# Patient Record
Sex: MALE | Race: WHITE | NOT HISPANIC OR LATINO
[De-identification: names, ages, dates, MRNs, and addresses within clinical notes are randomized per-mention and may not be internally consistent; named-entity substitution may affect disease eponyms.]

---

## 2017-03-24 ENCOUNTER — APPOINTMENT (OUTPATIENT)
Dept: INTERNAL MEDICINE | Facility: CLINIC | Age: 77
End: 2017-03-24

## 2017-03-29 LAB
INR PPP: 2 RATIO
POCT-PROTHROMBIN TIME: 23.7 SECS

## 2017-04-28 ENCOUNTER — APPOINTMENT (OUTPATIENT)
Dept: INTERNAL MEDICINE | Facility: CLINIC | Age: 77
End: 2017-04-28

## 2017-04-28 LAB
INR PPP: 2.1 RATIO
POCT-PROTHROMBIN TIME: 25.4 SECS

## 2017-05-24 ENCOUNTER — APPOINTMENT (OUTPATIENT)
Dept: INTERNAL MEDICINE | Facility: CLINIC | Age: 77
End: 2017-05-24

## 2017-05-24 ENCOUNTER — RESULT CHARGE (OUTPATIENT)
Age: 77
End: 2017-05-24

## 2017-05-30 ENCOUNTER — OTHER (OUTPATIENT)
Age: 77
End: 2017-05-30

## 2017-05-30 LAB
CHOLEST SERPL-MCNC: 144
GLUCOSE SERPL-MCNC: 90
HBA1C MFR BLD HPLC: 5.7
HDLC SERPL-MCNC: 54
INR PPP: 2.2 RATIO
LDLC SERPL CALC-MCNC: 80
POCT-PROTHROMBIN TIME: 25.8 SECS
PSA SERPL-MCNC: 90

## 2017-06-06 ENCOUNTER — APPOINTMENT (OUTPATIENT)
Dept: INTERNAL MEDICINE | Facility: CLINIC | Age: 77
End: 2017-06-06

## 2017-06-12 ENCOUNTER — MEDICATION RENEWAL (OUTPATIENT)
Age: 77
End: 2017-06-12

## 2017-06-12 ENCOUNTER — APPOINTMENT (OUTPATIENT)
Dept: INTERNAL MEDICINE | Facility: CLINIC | Age: 77
End: 2017-06-12

## 2017-06-13 ENCOUNTER — OTHER (OUTPATIENT)
Age: 77
End: 2017-06-13

## 2017-06-13 ENCOUNTER — APPOINTMENT (OUTPATIENT)
Dept: INTERNAL MEDICINE | Facility: CLINIC | Age: 77
End: 2017-06-13

## 2017-06-13 DIAGNOSIS — D23.9 OTHER BENIGN NEOPLASM OF SKIN, UNSPECIFIED: ICD-10-CM

## 2017-06-13 DIAGNOSIS — J30.2 OTHER SEASONAL ALLERGIC RHINITIS: ICD-10-CM

## 2017-06-13 DIAGNOSIS — H91.93 UNSPECIFIED HEARING LOSS, BILATERAL: ICD-10-CM

## 2017-06-13 DIAGNOSIS — Z88.9 ALLERGY STATUS TO UNSPECIFIED DRUGS, MEDICAMENTS AND BIOLOGICAL SUBSTANCES: ICD-10-CM

## 2017-06-13 DIAGNOSIS — M19.90 UNSPECIFIED OSTEOARTHRITIS, UNSPECIFIED SITE: ICD-10-CM

## 2017-06-13 DIAGNOSIS — R42 DIZZINESS AND GIDDINESS: ICD-10-CM

## 2017-06-29 ENCOUNTER — RX RENEWAL (OUTPATIENT)
Age: 77
End: 2017-06-29

## 2017-07-06 ENCOUNTER — RESULT CHARGE (OUTPATIENT)
Age: 77
End: 2017-07-06

## 2017-07-07 ENCOUNTER — APPOINTMENT (OUTPATIENT)
Dept: INTERNAL MEDICINE | Facility: CLINIC | Age: 77
End: 2017-07-07

## 2017-07-07 ENCOUNTER — OTHER (OUTPATIENT)
Age: 77
End: 2017-07-07

## 2017-07-18 ENCOUNTER — RX RENEWAL (OUTPATIENT)
Age: 77
End: 2017-07-18

## 2017-07-24 LAB
INR PPP: 2.3 RATIO
POCT-PROTHROMBIN TIME: 27.4 SECS

## 2017-08-11 ENCOUNTER — APPOINTMENT (OUTPATIENT)
Dept: INTERNAL MEDICINE | Facility: CLINIC | Age: 77
End: 2017-08-11
Payer: MEDICARE

## 2017-08-11 PROCEDURE — 99212 OFFICE O/P EST SF 10 MIN: CPT | Mod: 25

## 2017-08-11 PROCEDURE — 85610 PROTHROMBIN TIME: CPT | Mod: QW

## 2017-08-30 LAB — INR PPP: 2.3 RATIO

## 2017-09-27 ENCOUNTER — APPOINTMENT (OUTPATIENT)
Dept: INTERNAL MEDICINE | Facility: CLINIC | Age: 77
End: 2017-09-27
Payer: MEDICARE

## 2017-09-27 VITALS
SYSTOLIC BLOOD PRESSURE: 132 MMHG | RESPIRATION RATE: 16 BRPM | HEART RATE: 74 BPM | BODY MASS INDEX: 44.1 KG/M2 | HEIGHT: 71 IN | OXYGEN SATURATION: 97 % | WEIGHT: 315 LBS | TEMPERATURE: 98.2 F | DIASTOLIC BLOOD PRESSURE: 60 MMHG

## 2017-09-27 LAB
INR PPP: 2.1
PT BLD: 25.5

## 2017-09-27 PROCEDURE — 99214 OFFICE O/P EST MOD 30 MIN: CPT | Mod: 25

## 2017-09-27 PROCEDURE — G0008: CPT

## 2017-09-27 PROCEDURE — 90662 IIV NO PRSV INCREASED AG IM: CPT

## 2017-09-27 PROCEDURE — 94060 EVALUATION OF WHEEZING: CPT

## 2017-09-27 PROCEDURE — 94729 DIFFUSING CAPACITY: CPT

## 2017-10-02 ENCOUNTER — RX RENEWAL (OUTPATIENT)
Age: 77
End: 2017-10-02

## 2017-11-13 ENCOUNTER — RX RENEWAL (OUTPATIENT)
Age: 77
End: 2017-11-13

## 2017-11-21 ENCOUNTER — RESULT CHARGE (OUTPATIENT)
Age: 77
End: 2017-11-21

## 2017-11-21 ENCOUNTER — APPOINTMENT (OUTPATIENT)
Dept: INTERNAL MEDICINE | Facility: CLINIC | Age: 77
End: 2017-11-21
Payer: MEDICARE

## 2017-11-21 PROCEDURE — ZZZZZ: CPT

## 2017-11-21 PROCEDURE — 85610 PROTHROMBIN TIME: CPT | Mod: QW

## 2017-11-21 PROCEDURE — 99212 OFFICE O/P EST SF 10 MIN: CPT | Mod: 25

## 2017-12-01 ENCOUNTER — OTHER (OUTPATIENT)
Age: 77
End: 2017-12-01

## 2017-12-28 ENCOUNTER — APPOINTMENT (OUTPATIENT)
Dept: INTERNAL MEDICINE | Facility: CLINIC | Age: 77
End: 2017-12-28
Payer: MEDICARE

## 2017-12-28 LAB
INR PPP: 2.2 RATIO
POCT-PROTHROMBIN TIME: 26 SECS

## 2017-12-28 PROCEDURE — 99212 OFFICE O/P EST SF 10 MIN: CPT | Mod: 25

## 2017-12-28 PROCEDURE — 85610 PROTHROMBIN TIME: CPT | Mod: QW

## 2018-01-10 ENCOUNTER — RX RENEWAL (OUTPATIENT)
Age: 78
End: 2018-01-10

## 2018-02-01 ENCOUNTER — APPOINTMENT (OUTPATIENT)
Dept: INTERNAL MEDICINE | Facility: CLINIC | Age: 78
End: 2018-02-01
Payer: MEDICARE

## 2018-02-01 LAB
INR PPP: 2 RATIO
POCT-PROTHROMBIN TIME: 23.9 SECS

## 2018-02-01 PROCEDURE — 99212 OFFICE O/P EST SF 10 MIN: CPT | Mod: 25

## 2018-02-01 PROCEDURE — 85610 PROTHROMBIN TIME: CPT | Mod: QW

## 2018-02-20 ENCOUNTER — RX RENEWAL (OUTPATIENT)
Age: 78
End: 2018-02-20

## 2018-03-23 ENCOUNTER — APPOINTMENT (OUTPATIENT)
Dept: INTERNAL MEDICINE | Facility: CLINIC | Age: 78
End: 2018-03-23
Payer: MEDICARE

## 2018-03-23 LAB
INR PPP: 2.2 RATIO
POCT-PROTHROMBIN TIME: 26.5 SECS

## 2018-03-23 PROCEDURE — 85610 PROTHROMBIN TIME: CPT | Mod: QW

## 2018-03-23 PROCEDURE — 99212 OFFICE O/P EST SF 10 MIN: CPT | Mod: 25

## 2018-04-06 ENCOUNTER — APPOINTMENT (OUTPATIENT)
Dept: INTERNAL MEDICINE | Facility: CLINIC | Age: 78
End: 2018-04-06

## 2018-05-24 ENCOUNTER — APPOINTMENT (OUTPATIENT)
Dept: INTERNAL MEDICINE | Facility: CLINIC | Age: 78
End: 2018-05-24
Payer: MEDICARE

## 2018-05-24 PROCEDURE — 85610 PROTHROMBIN TIME: CPT | Mod: QW

## 2018-05-24 PROCEDURE — 99212 OFFICE O/P EST SF 10 MIN: CPT | Mod: 25

## 2018-06-02 ENCOUNTER — EMERGENCY (EMERGENCY)
Facility: HOSPITAL | Age: 78
LOS: 0 days | Discharge: HOME | End: 2018-06-02
Attending: EMERGENCY MEDICINE | Admitting: EMERGENCY MEDICINE
Payer: MEDICARE

## 2018-06-02 VITALS
DIASTOLIC BLOOD PRESSURE: 63 MMHG | HEART RATE: 53 BPM | SYSTOLIC BLOOD PRESSURE: 121 MMHG | OXYGEN SATURATION: 96 % | RESPIRATION RATE: 17 BRPM | TEMPERATURE: 98 F

## 2018-06-02 VITALS — WEIGHT: 300.05 LBS

## 2018-06-02 DIAGNOSIS — K92.1 MELENA: ICD-10-CM

## 2018-06-02 DIAGNOSIS — K62.5 HEMORRHAGE OF ANUS AND RECTUM: ICD-10-CM

## 2018-06-02 DIAGNOSIS — Z79.01 LONG TERM (CURRENT) USE OF ANTICOAGULANTS: ICD-10-CM

## 2018-06-02 DIAGNOSIS — Z98.890 OTHER SPECIFIED POSTPROCEDURAL STATES: Chronic | ICD-10-CM

## 2018-06-02 DIAGNOSIS — I48.91 UNSPECIFIED ATRIAL FIBRILLATION: ICD-10-CM

## 2018-06-02 LAB
ALBUMIN SERPL ELPH-MCNC: 3.5 G/DL — SIGNIFICANT CHANGE UP (ref 3.3–5)
ALP SERPL-CCNC: 93 U/L — SIGNIFICANT CHANGE UP (ref 40–120)
ALT FLD-CCNC: 29 U/L — SIGNIFICANT CHANGE UP (ref 12–78)
ANION GAP SERPL CALC-SCNC: 8 MMOL/L — SIGNIFICANT CHANGE UP (ref 5–17)
APTT BLD: 37.9 SEC — HIGH (ref 27.5–37.4)
AST SERPL-CCNC: 25 U/L — SIGNIFICANT CHANGE UP (ref 15–37)
BASOPHILS # BLD AUTO: 0.03 K/UL — SIGNIFICANT CHANGE UP (ref 0–0.2)
BASOPHILS NFR BLD AUTO: 0.4 % — SIGNIFICANT CHANGE UP (ref 0–2)
BILIRUB SERPL-MCNC: 0.9 MG/DL — SIGNIFICANT CHANGE UP (ref 0.2–1.2)
BLD GP AB SCN SERPL QL: SIGNIFICANT CHANGE UP
BUN SERPL-MCNC: 25 MG/DL — HIGH (ref 7–23)
CALCIUM SERPL-MCNC: 8.7 MG/DL — SIGNIFICANT CHANGE UP (ref 8.5–10.1)
CHLORIDE SERPL-SCNC: 108 MMOL/L — SIGNIFICANT CHANGE UP (ref 96–108)
CO2 SERPL-SCNC: 25 MMOL/L — SIGNIFICANT CHANGE UP (ref 22–31)
CREAT SERPL-MCNC: 1.15 MG/DL — SIGNIFICANT CHANGE UP (ref 0.5–1.3)
EOSINOPHIL # BLD AUTO: 0.2 K/UL — SIGNIFICANT CHANGE UP (ref 0–0.5)
EOSINOPHIL NFR BLD AUTO: 2.7 % — SIGNIFICANT CHANGE UP (ref 0–6)
GLUCOSE SERPL-MCNC: 85 MG/DL — SIGNIFICANT CHANGE UP (ref 70–99)
HCT VFR BLD CALC: 42.5 % — SIGNIFICANT CHANGE UP (ref 39–50)
HGB BLD-MCNC: 13.8 G/DL — SIGNIFICANT CHANGE UP (ref 13–17)
IMM GRANULOCYTES NFR BLD AUTO: 0.4 % — SIGNIFICANT CHANGE UP (ref 0–1.5)
INR BLD: 2.78 RATIO — HIGH (ref 0.88–1.16)
LYMPHOCYTES # BLD AUTO: 0.91 K/UL — LOW (ref 1–3.3)
LYMPHOCYTES # BLD AUTO: 12.5 % — LOW (ref 13–44)
MCHC RBC-ENTMCNC: 29.2 PG — SIGNIFICANT CHANGE UP (ref 27–34)
MCHC RBC-ENTMCNC: 32.5 GM/DL — SIGNIFICANT CHANGE UP (ref 32–36)
MCV RBC AUTO: 90 FL — SIGNIFICANT CHANGE UP (ref 80–100)
MONOCYTES # BLD AUTO: 0.44 K/UL — SIGNIFICANT CHANGE UP (ref 0–0.9)
MONOCYTES NFR BLD AUTO: 6 % — SIGNIFICANT CHANGE UP (ref 2–14)
NEUTROPHILS # BLD AUTO: 5.67 K/UL — SIGNIFICANT CHANGE UP (ref 1.8–7.4)
NEUTROPHILS NFR BLD AUTO: 78 % — HIGH (ref 43–77)
NRBC # BLD: 0 /100 WBCS — SIGNIFICANT CHANGE UP (ref 0–0)
PLATELET # BLD AUTO: 145 K/UL — LOW (ref 150–400)
POTASSIUM SERPL-MCNC: 3.9 MMOL/L — SIGNIFICANT CHANGE UP (ref 3.5–5.3)
POTASSIUM SERPL-SCNC: 3.9 MMOL/L — SIGNIFICANT CHANGE UP (ref 3.5–5.3)
PROT SERPL-MCNC: 6.7 GM/DL — SIGNIFICANT CHANGE UP (ref 6–8.3)
PROTHROM AB SERPL-ACNC: 30.7 SEC — HIGH (ref 9.8–12.7)
RBC # BLD: 4.72 M/UL — SIGNIFICANT CHANGE UP (ref 4.2–5.8)
RBC # FLD: 13.6 % — SIGNIFICANT CHANGE UP (ref 10.3–14.5)
SODIUM SERPL-SCNC: 141 MMOL/L — SIGNIFICANT CHANGE UP (ref 135–145)
TROPONIN I SERPL-MCNC: <0.015 NG/ML — SIGNIFICANT CHANGE UP (ref 0.01–0.04)
TROPONIN I SERPL-MCNC: <0.015 NG/ML — SIGNIFICANT CHANGE UP (ref 0.01–0.04)
TYPE + AB SCN PNL BLD: SIGNIFICANT CHANGE UP
WBC # BLD: 7.28 K/UL — SIGNIFICANT CHANGE UP (ref 3.8–10.5)
WBC # FLD AUTO: 7.28 K/UL — SIGNIFICANT CHANGE UP (ref 3.8–10.5)

## 2018-06-02 PROCEDURE — 93010 ELECTROCARDIOGRAM REPORT: CPT

## 2018-06-02 PROCEDURE — 99284 EMERGENCY DEPT VISIT MOD MDM: CPT

## 2018-06-02 NOTE — ED PROVIDER NOTE - OBJECTIVE STATEMENT
78 y/o M with PMHx of sleep apnea with CPAP (no orthopnea or dyspnea on exertion), AFib on Wafarin 7.5 mg 4 days a week and 5 mg 3 days a week presents to the ED c/o passing blood with stool for over a week. Pt reports his stool has been slightly dark. Denies abd pain, n/v, CP, SOB, loss of appetite. Denies CAD, MI stents. Allergic to Advil and stays away from Aspirin. Pt occasionally takes Tylenol. Last coloscopy 8 years ago- pt deemed too large for virtual colonoscopy.  PCP: Dr. Sena.
76 y/o M with PMHx of sleep apnea with CPAP (no orthopnea or dyspnea on exertion), AFib on Wafarin 7.5 mg 4 days a week and 5 mg 3 days a week presents to the ED c/o passing blood with stool for over a week. Pt reports his stool has been slightly dark. Denies abd pain, n/v, CP, SOB, loss of appetite. Denies CAD, MI stents. Allergic to Advil and stays away from Aspirin. Pt occasionally takes Tylenol. Last coloscopy 8 years ago- pt deemed too large for virtual colonoscopy.  PCP: Dr. Sena.

## 2018-06-02 NOTE — ED STATDOCS - PROGRESS NOTE DETAILS
Brigid Powers on behalf of Dr. Cast: 76 y/o Male with a PMHx of A-Fib, DVT/PE(2009) presents to ED c/o blood in stool and bright red blood in toilet bowel with each BM x 1 week. +Melena. No weakness, no dizziness, no SOB, no chest pain, no abd pain. Pt is on Warfarin, last week checked to be 1.9. Denies any hx of GI bleed. Notes he is overdue for colonoscopy. GI Herbert. PMD Manju. Phil Godfrey. Will send patient to main for further evaluation. Brigid Powers on behalf of Dr. Cast: 78 y/o Male with a PMHx of A-Fib, DVT/PE(2009) presents to ED c/o blood in stool and bright red blood in toilet bowel with each BM x 1 week. +Melena. No weakness, no dizziness, no SOB, no chest pain, no abd pain. Pt is on Warfarin, last week checked to be 1.9. Denies any hx of GI bleed. Notes he is overdue for colonoscopy. No prior blood transfusions. GI Herbert. PMD Manju. Phil Godfrey. Will send patient to main for further evaluation.

## 2018-06-02 NOTE — ED PROVIDER NOTE - PROGRESS NOTE DETAILS
Scribe DV for ED attending, Doctor Contino: Rectal exam sphincter tone normal light brown stool guaiac negative Lot [id number] QC passed. Jesu Pierre to discuss further management. Brigid ARREGUIN for ED attending, Doctor Contino: Paging GI Dr. Pierre to discuss further management.

## 2018-06-02 NOTE — ED PROVIDER NOTE - MEDICAL DECISION MAKING DETAILS
Elderly male on Wafarin for AFib, DVT/ PE ambulatory to ED regarding 1 week intermittent episode of bloody bowel movement. No CP, SO, abd pain. Plan: labs, rectal exam, vital signs, discuss with GI.
Elderly male on Wafarin for AFib, DVT/ PE ambulatory to ED regarding 1 week intermittent episode of bloody bowel movement. No CP, SO, abd pain. Plan: labs, rectal exam, vital signs, discuss with GI.

## 2018-06-02 NOTE — ED PROVIDER NOTE - MUSCULOSKELETAL, MLM
MAEx4. B/L pretibial edema no focal tenderness. B/L SLR 30 degrees normal motor.
MAEx4. B/L pretibial edema no focal tenderness. B/L SLR 30 degrees normal motor.

## 2018-06-02 NOTE — ED ADULT NURSE NOTE - CHIEF COMPLAINT QUOTE
pt c/o passing blood when passing BM and blood in stool for 1-2 weeks, no weakness, dizzines SOB or hx of Gi bleed.

## 2018-06-02 NOTE — ED PROVIDER NOTE - CARDIAC, MLM
Irregularly irregular rhythm and reg rate. Normal radial pulse.
Irregularly irregular rhythm and reg rate. Normal radial pulse.

## 2018-06-02 NOTE — ED PROVIDER NOTE - GASTROINTESTINAL, MLM
Abdomen soft, non-tender.  No CVA TTP. Deferred. Bowel sounds hypoactive normal pitch.
Abdomen soft, non-tender.  No CVA TTP.

## 2018-06-02 NOTE — ED ADULT NURSE NOTE - OBJECTIVE STATEMENT
Pt. to the ED C/O Rectal Bleeding from rectum with dark stools x 1 week- Pt. denies nausea, vomiting , abdominal pain, CP and SOB- Pt. states he is on Coumadin  for Afib- Hx. of HTN and HLD- IV, Labs in place- Will cont to monitor patient closely- Safety maintained

## 2018-06-02 NOTE — ED PROVIDER NOTE - PROGRESS NOTE DETAILS
Scribe DV for ED attending, Doctor Contino: Rectal exam sphincter tone normal light brown stool guaiac negative Lot 129 QC passed. Brigid ARREGUIN for ED attending, Doctor Contino: Paging GI Dr. Pierre to discuss further management. Case discussed with Dr. ZAKIA Bolivar on call for GI, he agrees that given pt hemodynamically stable, asymptomatic, normal hemoglobin count with therapeutic INR and guaiac negative, there is no acute medical necessity for admission at this time, he agrees with d/c home, followup 2-3 days with own GI MD. I, Nabor Ramos, attest that this documentation has been prepared under the direction and in the presence of Doctor Contino.

## 2018-06-08 ENCOUNTER — APPOINTMENT (OUTPATIENT)
Dept: INTERNAL MEDICINE | Facility: CLINIC | Age: 78
End: 2018-06-08
Payer: MEDICARE

## 2018-06-08 VITALS
HEIGHT: 71 IN | BODY MASS INDEX: 42.28 KG/M2 | DIASTOLIC BLOOD PRESSURE: 70 MMHG | SYSTOLIC BLOOD PRESSURE: 144 MMHG | RESPIRATION RATE: 18 BRPM | OXYGEN SATURATION: 97 % | HEART RATE: 57 BPM | TEMPERATURE: 97.6 F | WEIGHT: 302 LBS

## 2018-06-08 DIAGNOSIS — Z83.3 FAMILY HISTORY OF DIABETES MELLITUS: ICD-10-CM

## 2018-06-08 DIAGNOSIS — Z78.9 OTHER SPECIFIED HEALTH STATUS: ICD-10-CM

## 2018-06-08 DIAGNOSIS — Z82.3 FAMILY HISTORY OF STROKE: ICD-10-CM

## 2018-06-08 LAB
INR PPP: 2.5 RATIO
POCT-PROTHROMBIN TIME: 30.2 SECS
QUALITY CONTROL: YES

## 2018-06-08 PROCEDURE — 85610 PROTHROMBIN TIME: CPT | Mod: QW

## 2018-06-08 PROCEDURE — 94060 EVALUATION OF WHEEZING: CPT

## 2018-06-08 PROCEDURE — 94729 DIFFUSING CAPACITY: CPT

## 2018-06-08 PROCEDURE — 99214 OFFICE O/P EST MOD 30 MIN: CPT | Mod: 25

## 2018-06-08 PROCEDURE — 94727 GAS DIL/WSHOT DETER LNG VOL: CPT

## 2018-06-08 RX ORDER — MONTELUKAST SODIUM 10 MG/1
10 TABLET, FILM COATED ORAL
Refills: 0 | Status: COMPLETED | COMMUNITY
End: 2018-06-08

## 2018-06-08 RX ORDER — FUROSEMIDE 40 MG/1
40 TABLET ORAL DAILY
Refills: 0 | Status: ACTIVE | COMMUNITY

## 2018-06-08 RX ORDER — BUDESONIDE AND FORMOTEROL FUMARATE DIHYDRATE 160; 4.5 UG/1; UG/1
160-4.5 AEROSOL RESPIRATORY (INHALATION)
Qty: 3 | Refills: 3 | Status: COMPLETED | COMMUNITY
Start: 2017-05-24 | End: 2018-06-08

## 2018-06-08 RX ORDER — BUDESONIDE AND FORMOTEROL FUMARATE DIHYDRATE 160; 4.5 UG/1; UG/1
160-4.5 AEROSOL RESPIRATORY (INHALATION)
Refills: 0 | Status: COMPLETED | COMMUNITY
End: 2018-06-08

## 2018-06-08 RX ORDER — LORATADINE 5 MG/5 ML
10 SOLUTION, ORAL ORAL
Refills: 0 | Status: ACTIVE | COMMUNITY

## 2018-06-08 RX ORDER — DUTASTERIDE 0.5 MG/1
0.5 CAPSULE, LIQUID FILLED ORAL
Refills: 0 | Status: ACTIVE | COMMUNITY

## 2018-06-08 RX ORDER — FLUTICASONE PROPIONATE 50 MCG
50 SPRAY, SUSPENSION NASAL
Refills: 0 | Status: COMPLETED | COMMUNITY
End: 2018-06-08

## 2018-06-08 RX ORDER — POTASSIUM CHLORIDE 1500 MG/1
20 TABLET, EXTENDED RELEASE ORAL
Qty: 180 | Refills: 0 | Status: ACTIVE | COMMUNITY
Start: 2018-03-08

## 2018-06-08 RX ORDER — WARFARIN SODIUM 6 MG/1
TABLET ORAL
Refills: 0 | Status: COMPLETED | COMMUNITY
End: 2018-06-08

## 2018-06-08 RX ORDER — ALFUZOSIN HYDROCHLORIDE 10 MG/1
10 TABLET, EXTENDED RELEASE ORAL
Refills: 0 | Status: COMPLETED | COMMUNITY
End: 2018-06-08

## 2018-06-08 RX ORDER — POTASSIUM CHLORIDE 1500 MG/1
20 TABLET, FILM COATED, EXTENDED RELEASE ORAL DAILY
Refills: 0 | Status: COMPLETED | COMMUNITY
End: 2018-06-08

## 2018-06-08 NOTE — PLAN
[FreeTextEntry1] : He will continue current medications but refuses Symbicort.\par Colonoscopy to be scheduled.\par He has been given clear instructions regarding pre-procedure anticoagulation. \par Last Coumadin dose 5 days before procedure. No AC next day. Start Lovenox 150mg BID for 2 days then AM only the day before procedure. Coumadin and Lovenox BID will be restarted the PM after procedure if OK with Dr Stokes. Both will be continued until INR therapeutic.\par INR 3 days after procedure.\par He will f/u with Dr David.\par FBW in Sept-see RX copy.\par Dr Irizarry for ortho f/u.\par He is due opthal and dental visit now.\par Dr Knott 2x a year for urology f/u.\par He will return in 6 mos or sooner PRN.

## 2018-06-08 NOTE — HISTORY OF PRESENT ILLNESS
[FreeTextEntry8] : "I had some bleeding."\par \par The patient presented to the ER on 6-2-18 on the recommendation of Dr Stokes after reporting 2 weeks of rectal bleeding.  He was sent home w/o intervention and is in the process of scheduling colonoscopy with Dr Stokes. Bleeding has stopped and he denies abd pain, back pain or pelvic pain. \par \par He has been compliant with all medications but stopped Symbicort on his own. Bilat knee pain persists and he will be returning to Dr Irizarry. He reports stable BURKS with mild AM cough but no chest pain, palpitation, wheeze or hemoptysis. he is followed 2x a tear by cardiology. He is compliant with CPAP. Weight is below 300 today.

## 2018-06-08 NOTE — DATA REVIEWED
[FreeTextEntry1] : Limited HH records reviewed from 6-2-18, including EKG. Mild thrombocytopenia.\par EKG AF  with VR 57 and single PFT, bifascicular block, T inv III. \par \par EKG has been reviewed with Dr David. "Bifascicular block is chronic."\par \par PFT is performed today. Flow rates are normal, ERV is reduced and diffusion is normal.

## 2018-06-08 NOTE — REVIEW OF SYSTEMS
[Fatigue] : fatigue [Recent Change In Weight] : ~T recent weight change [Vision Problems] : vision problems [Hearing Loss] : hearing loss [Lower Ext Edema] : lower extremity edema [Orthopena] : orthopnea [Cough] : cough [Dyspnea on Exertion] : dyspnea on exertion [Hesitancy] : hesitancy [Nocturia] : nocturia [Frequency] : frequency [Joint Pain] : joint pain [Joint Stiffness] : joint stiffness [Anxiety] : anxiety [Depression] : depression [Negative] : Heme/Lymph [Fever] : no fever [Chills] : no chills [Night Sweats] : no night sweats [Pain] : no pain [Itching] : no itching [Nosebleeds] : no nosebleeds [Nasal Discharge] : no nasal discharge [Sore Throat] : no sore throat [Hoarseness] : no hoarseness [Chest Pain] : no chest pain [Palpitations] : no palpitations [Claudication] : no  leg claudication [Paroysmal Nocturnal Dyspnea] : no paroysmal nocturnal dyspnea [Wheezing] : no wheezing [Abdominal Pain] : no abdominal pain [Nausea] : no nausea [Constipation] : no constipation [Diarrhea] : no diarrhea [Vomiting] : no vomiting [Heartburn] : no heartburn [Melena] : no melena [Dysuria] : no dysuria [Incontinence] : no incontinence [Hematuria] : no hematuria [Muscle Pain] : no muscle pain [Back Pain] : no back pain [Joint Swelling] : no joint swelling [Hair Changes] : no hair changes [Skin Rash] : no skin rash [Headache] : no headache [Dizziness] : no dizziness [Fainting] : no fainting [Confusion] : no confusion [Unsteady Walk] : no ataxia [Memory Loss] : no memory loss [Insomnia] : no insomnia [Easy Bruising] : no easy bruising [Swollen Glands] : no swollen glands [FreeTextEntry2] : intentional weight loss [FreeTextEntry7] : BRBPR

## 2018-06-08 NOTE — PHYSICAL EXAM
[No Acute Distress] : no acute distress [Well Developed] : well developed [Well-Appearing] : well-appearing [Normal Voice/Communication] : normal voice/communication [Normal Sclera/Conjunctiva] : normal sclera/conjunctiva [PERRL] : pupils equal round and reactive to light [EOMI] : extraocular movements intact [Normal Outer Ear/Nose] : the outer ears and nose were normal in appearance [Normal Oropharynx] : the oropharynx was normal [Normal Nasal Mucosa] : the nasal mucosa was normal [No JVD] : no jugular venous distention [Supple] : supple [No Lymphadenopathy] : no lymphadenopathy [Thyroid Normal, No Nodules] : the thyroid was normal and there were no nodules present [No Respiratory Distress] : no respiratory distress  [Clear to Auscultation] : lungs were clear to auscultation bilaterally [No Accessory Muscle Use] : no accessory muscle use [Normal S1, S2] : normal S1 and S2 [No Carotid Bruits] : no carotid bruits [No Varicosities] : no varicosities [No Extremity Clubbing/Cyanosis] : no extremity clubbing/cyanosis [Soft] : abdomen soft [Non Tender] : non-tender [Normal Bowel Sounds] : normal bowel sounds [Normal Supraclavicular Nodes] : no supraclavicular lymphadenopathy [Normal Anterior Cervical Nodes] : no anterior cervical lymphadenopathy [No CVA Tenderness] : no CVA  tenderness [No Joint Swelling] : no joint swelling [Grossly Normal Strength/Tone] : grossly normal strength/tone [No Rash] : no rash [No Skin Lesions] : no skin lesions [Coordination Grossly Intact] : coordination grossly intact [No Focal Deficits] : no focal deficits [Speech Grossly Normal] : speech grossly normal [Memory Grossly Normal] : memory grossly normal [Normal Affect] : the affect was normal [Alert and Oriented x3] : oriented to person, place, and time [Normal Mood] : the mood was normal [Normal Insight/Judgement] : insight and judgment were intact [de-identified] : obese [de-identified] : wearing glasses [de-identified] : bull neck [de-identified] : irreg irreg bradycardia with 1/6 Deborah at [facility] [de-identified] : 3+ pedal edema bilat with TEDS in place knee high. [de-identified] : obese [de-identified] : Bilat knee pain with weight bearing. Anatalgic gait bilat.

## 2018-06-08 NOTE — PHYSICAL EXAM
[No Acute Distress] : no acute distress [Well Developed] : well developed [Well-Appearing] : well-appearing [Normal Voice/Communication] : normal voice/communication [Normal Sclera/Conjunctiva] : normal sclera/conjunctiva [PERRL] : pupils equal round and reactive to light [EOMI] : extraocular movements intact [Normal Outer Ear/Nose] : the outer ears and nose were normal in appearance [Normal Oropharynx] : the oropharynx was normal [Normal Nasal Mucosa] : the nasal mucosa was normal [No JVD] : no jugular venous distention [Supple] : supple [No Lymphadenopathy] : no lymphadenopathy [Thyroid Normal, No Nodules] : the thyroid was normal and there were no nodules present [No Respiratory Distress] : no respiratory distress  [Clear to Auscultation] : lungs were clear to auscultation bilaterally [No Accessory Muscle Use] : no accessory muscle use [Normal S1, S2] : normal S1 and S2 [No Carotid Bruits] : no carotid bruits [No Varicosities] : no varicosities [No Extremity Clubbing/Cyanosis] : no extremity clubbing/cyanosis [Soft] : abdomen soft [Non Tender] : non-tender [Normal Bowel Sounds] : normal bowel sounds [Normal Supraclavicular Nodes] : no supraclavicular lymphadenopathy [Normal Anterior Cervical Nodes] : no anterior cervical lymphadenopathy [No CVA Tenderness] : no CVA  tenderness [No Joint Swelling] : no joint swelling [Grossly Normal Strength/Tone] : grossly normal strength/tone [No Rash] : no rash [No Skin Lesions] : no skin lesions [Coordination Grossly Intact] : coordination grossly intact [No Focal Deficits] : no focal deficits [Speech Grossly Normal] : speech grossly normal [Memory Grossly Normal] : memory grossly normal [Normal Affect] : the affect was normal [Alert and Oriented x3] : oriented to person, place, and time [Normal Mood] : the mood was normal [Normal Insight/Judgement] : insight and judgment were intact [de-identified] : obese [de-identified] : wearing glasses [de-identified] : bull neck [de-identified] : irreg irreg bradycardia with 1/6 Deborah at [facility] [de-identified] : 3+ pedal edema bilat with TEDS in place knee high. [de-identified] : obese [de-identified] : Bilat knee pain with weight bearing. Anatalgic gait bilat.

## 2018-07-12 ENCOUNTER — RX RENEWAL (OUTPATIENT)
Age: 78
End: 2018-07-12

## 2018-07-17 ENCOUNTER — RX RENEWAL (OUTPATIENT)
Age: 78
End: 2018-07-17

## 2018-08-01 PROBLEM — I48.91 UNSPECIFIED ATRIAL FIBRILLATION: Chronic | Status: ACTIVE | Noted: 2018-06-02

## 2018-08-01 PROBLEM — G47.30 SLEEP APNEA, UNSPECIFIED: Chronic | Status: ACTIVE | Noted: 2018-06-02

## 2018-08-02 ENCOUNTER — APPOINTMENT (OUTPATIENT)
Dept: INTERNAL MEDICINE | Facility: CLINIC | Age: 78
End: 2018-08-02
Payer: MEDICARE

## 2018-08-02 PROCEDURE — 85610 PROTHROMBIN TIME: CPT | Mod: QW

## 2018-08-02 PROCEDURE — 99212 OFFICE O/P EST SF 10 MIN: CPT | Mod: 25

## 2018-08-08 LAB
INR PPP: 1.9 RATIO
INR PPP: 2.7 RATIO
POCT-PROTHROMBIN TIME: 22.5 SECS
POCT-PROTHROMBIN TIME: 32.7 SECS

## 2018-08-09 ENCOUNTER — APPOINTMENT (OUTPATIENT)
Dept: INTERNAL MEDICINE | Facility: CLINIC | Age: 78
End: 2018-08-09
Payer: MEDICARE

## 2018-08-09 LAB
INR PPP: 1.8 RATIO
POCT-PROTHROMBIN TIME: 21.6 SECS

## 2018-08-09 PROCEDURE — 85610 PROTHROMBIN TIME: CPT | Mod: QW

## 2018-08-09 PROCEDURE — 99212 OFFICE O/P EST SF 10 MIN: CPT | Mod: 25

## 2018-08-10 ENCOUNTER — RESULT REVIEW (OUTPATIENT)
Age: 78
End: 2018-08-10

## 2018-08-10 ENCOUNTER — RESULT CHARGE (OUTPATIENT)
Age: 78
End: 2018-08-10

## 2018-08-10 ENCOUNTER — APPOINTMENT (OUTPATIENT)
Dept: INTERNAL MEDICINE | Facility: CLINIC | Age: 78
End: 2018-08-10
Payer: MEDICARE

## 2018-08-10 LAB
INR PPP: 1.5 RATIO
POCT-PROTHROMBIN TIME: 18 SECS

## 2018-08-10 PROCEDURE — 99212 OFFICE O/P EST SF 10 MIN: CPT | Mod: 25

## 2018-08-10 PROCEDURE — 85610 PROTHROMBIN TIME: CPT | Mod: QW

## 2018-08-15 ENCOUNTER — RESULT REVIEW (OUTPATIENT)
Age: 78
End: 2018-08-15

## 2018-08-17 ENCOUNTER — MEDICATION RENEWAL (OUTPATIENT)
Age: 78
End: 2018-08-17

## 2018-08-20 ENCOUNTER — LABORATORY RESULT (OUTPATIENT)
Age: 78
End: 2018-08-20

## 2018-08-20 ENCOUNTER — RESULT REVIEW (OUTPATIENT)
Age: 78
End: 2018-08-20

## 2018-08-20 ENCOUNTER — APPOINTMENT (OUTPATIENT)
Dept: INTERNAL MEDICINE | Facility: CLINIC | Age: 78
End: 2018-08-20

## 2018-08-20 ENCOUNTER — RX RENEWAL (OUTPATIENT)
Age: 78
End: 2018-08-20

## 2018-08-23 ENCOUNTER — RESULT REVIEW (OUTPATIENT)
Age: 78
End: 2018-08-23

## 2018-08-23 ENCOUNTER — LABORATORY RESULT (OUTPATIENT)
Age: 78
End: 2018-08-23

## 2018-08-27 ENCOUNTER — RX RENEWAL (OUTPATIENT)
Age: 78
End: 2018-08-27

## 2018-08-30 ENCOUNTER — LABORATORY RESULT (OUTPATIENT)
Age: 78
End: 2018-08-30

## 2018-08-30 ENCOUNTER — RESULT REVIEW (OUTPATIENT)
Age: 78
End: 2018-08-30

## 2018-09-05 LAB
INR PPP: 2.14 RATIO
PT BLD: 24.6 SEC

## 2018-09-13 ENCOUNTER — RESULT REVIEW (OUTPATIENT)
Age: 78
End: 2018-09-13

## 2018-09-13 ENCOUNTER — LABORATORY RESULT (OUTPATIENT)
Age: 78
End: 2018-09-13

## 2018-09-14 ENCOUNTER — OTHER (OUTPATIENT)
Age: 78
End: 2018-09-14

## 2018-09-27 ENCOUNTER — RESULT REVIEW (OUTPATIENT)
Age: 78
End: 2018-09-27

## 2018-09-27 ENCOUNTER — LABORATORY RESULT (OUTPATIENT)
Age: 78
End: 2018-09-27

## 2018-10-11 ENCOUNTER — LABORATORY RESULT (OUTPATIENT)
Age: 78
End: 2018-10-11

## 2018-10-18 ENCOUNTER — RESULT REVIEW (OUTPATIENT)
Age: 78
End: 2018-10-18

## 2018-10-18 LAB
INR PPP: 2.19
PT BLD: 25.1

## 2018-10-25 ENCOUNTER — LABORATORY RESULT (OUTPATIENT)
Age: 78
End: 2018-10-25

## 2018-10-25 ENCOUNTER — RESULT REVIEW (OUTPATIENT)
Age: 78
End: 2018-10-25

## 2018-11-26 ENCOUNTER — RESULT REVIEW (OUTPATIENT)
Age: 78
End: 2018-11-26

## 2018-11-26 ENCOUNTER — LABORATORY RESULT (OUTPATIENT)
Age: 78
End: 2018-11-26

## 2018-12-13 ENCOUNTER — LABORATORY RESULT (OUTPATIENT)
Age: 78
End: 2018-12-13

## 2019-01-04 ENCOUNTER — MEDICATION RENEWAL (OUTPATIENT)
Age: 79
End: 2019-01-04

## 2019-01-11 ENCOUNTER — LABORATORY RESULT (OUTPATIENT)
Age: 79
End: 2019-01-11

## 2019-01-23 ENCOUNTER — RX RENEWAL (OUTPATIENT)
Age: 79
End: 2019-01-23

## 2019-02-20 ENCOUNTER — LABORATORY RESULT (OUTPATIENT)
Age: 79
End: 2019-02-20

## 2019-02-21 ENCOUNTER — RX RENEWAL (OUTPATIENT)
Age: 79
End: 2019-02-21

## 2019-04-11 LAB
INR PPP: 2.5 RATIO
PT BLD: 29.1 SEC

## 2019-05-09 LAB
ALBUMIN SERPL ELPH-MCNC: 4.4 G/DL
ALP BLD-CCNC: 76 U/L
ALT SERPL-CCNC: 21 U/L
ANION GAP SERPL CALC-SCNC: 13 MMOL/L
APPEARANCE: CLEAR
AST SERPL-CCNC: 24 U/L
BACTERIA: NEGATIVE
BASOPHILS # BLD AUTO: 0.05 K/UL
BASOPHILS NFR BLD AUTO: 0.7 %
BILIRUB SERPL-MCNC: 0.8 MG/DL
BILIRUBIN URINE: NEGATIVE
BLOOD URINE: NEGATIVE
BUN SERPL-MCNC: 26 MG/DL
CALCIUM SERPL-MCNC: 9.4 MG/DL
CHLORIDE SERPL-SCNC: 104 MMOL/L
CHOLEST SERPL-MCNC: 137 MG/DL
CHOLEST/HDLC SERPL: 2.5 RATIO
CO2 SERPL-SCNC: 26 MMOL/L
COLOR: YELLOW
CREAT SERPL-MCNC: 1.1 MG/DL
EOSINOPHIL # BLD AUTO: 0.2 K/UL
EOSINOPHIL NFR BLD AUTO: 2.8 %
ESTIMATED AVERAGE GLUCOSE: 108 MG/DL
GLUCOSE QUALITATIVE U: NEGATIVE
GLUCOSE SERPL-MCNC: 90 MG/DL
HBA1C MFR BLD HPLC: 5.4 %
HCT VFR BLD CALC: 44.9 %
HDLC SERPL-MCNC: 55 MG/DL
HGB BLD-MCNC: 14.2 G/DL
HYALINE CASTS: 0 /LPF
IMM GRANULOCYTES NFR BLD AUTO: 0.4 %
INR PPP: 2.09 RATIO
KETONES URINE: NEGATIVE
LDLC SERPL CALC-MCNC: 72 MG/DL
LEUKOCYTE ESTERASE URINE: NEGATIVE
LYMPHOCYTES # BLD AUTO: 1.13 K/UL
LYMPHOCYTES NFR BLD AUTO: 16 %
MAN DIFF?: NORMAL
MCHC RBC-ENTMCNC: 28.9 PG
MCHC RBC-ENTMCNC: 31.6 GM/DL
MCV RBC AUTO: 91.4 FL
MICROSCOPIC-UA: NORMAL
MONOCYTES # BLD AUTO: 0.49 K/UL
MONOCYTES NFR BLD AUTO: 6.9 %
NEUTROPHILS # BLD AUTO: 5.17 K/UL
NEUTROPHILS NFR BLD AUTO: 73.2 %
NITRITE URINE: NEGATIVE
PH URINE: 6
PLATELET # BLD AUTO: 151 K/UL
POTASSIUM SERPL-SCNC: 4.2 MMOL/L
PROT SERPL-MCNC: 6.2 G/DL
PROTEIN URINE: NEGATIVE
PSA SERPL-MCNC: 1.5 NG/ML
PT BLD: 24.4 SEC
RBC # BLD: 4.91 M/UL
RBC # FLD: 13.5 %
RED BLOOD CELLS URINE: 1 /HPF
SODIUM SERPL-SCNC: 143 MMOL/L
SPECIFIC GRAVITY URINE: 1.02
SQUAMOUS EPITHELIAL CELLS: 0 /HPF
TRIGL SERPL-MCNC: 51 MG/DL
TSH SERPL-ACNC: 3.05 UIU/ML
UROBILINOGEN URINE: NORMAL
WBC # FLD AUTO: 7.07 K/UL
WHITE BLOOD CELLS URINE: 0 /HPF

## 2019-05-21 ENCOUNTER — APPOINTMENT (OUTPATIENT)
Dept: INTERNAL MEDICINE | Facility: CLINIC | Age: 79
End: 2019-05-21
Payer: MEDICARE

## 2019-05-21 VITALS
SYSTOLIC BLOOD PRESSURE: 144 MMHG | HEIGHT: 70 IN | TEMPERATURE: 98.5 F | OXYGEN SATURATION: 97 % | BODY MASS INDEX: 42.09 KG/M2 | RESPIRATION RATE: 20 BRPM | DIASTOLIC BLOOD PRESSURE: 70 MMHG | WEIGHT: 294 LBS | HEART RATE: 54 BPM

## 2019-05-21 DIAGNOSIS — Z87.828 PERSONAL HISTORY OF OTHER (HEALED) PHYSICAL INJURY AND TRAUMA: ICD-10-CM

## 2019-05-21 DIAGNOSIS — R09.02 HYPOXEMIA: ICD-10-CM

## 2019-05-21 DIAGNOSIS — I87.009 POSTTHROMBOTIC SYNDROME W/OUT COMPLICATIONS OF UNSPECIFIED EXTREMITY: ICD-10-CM

## 2019-05-21 DIAGNOSIS — M75.80 OTHER SHOULDER LESIONS, UNSPECIFIED SHOULDER: ICD-10-CM

## 2019-05-21 DIAGNOSIS — Z87.448 PERSONAL HISTORY OF OTHER DISEASES OF URINARY SYSTEM: ICD-10-CM

## 2019-05-21 DIAGNOSIS — M54.2 CERVICALGIA: ICD-10-CM

## 2019-05-21 DIAGNOSIS — G89.29 CERVICALGIA: ICD-10-CM

## 2019-05-21 DIAGNOSIS — Z00.00 ENCOUNTER FOR GENERAL ADULT MEDICAL EXAMINATION W/OUT ABNORMAL FINDINGS: ICD-10-CM

## 2019-05-21 DIAGNOSIS — J96.01 ACUTE RESPIRATORY FAILURE WITH HYPOXIA: ICD-10-CM

## 2019-05-21 DIAGNOSIS — K62.5 HEMORRHAGE OF ANUS AND RECTUM: ICD-10-CM

## 2019-05-21 DIAGNOSIS — I49.3 VENTRICULAR PREMATURE DEPOLARIZATION: ICD-10-CM

## 2019-05-21 PROCEDURE — 99214 OFFICE O/P EST MOD 30 MIN: CPT

## 2019-05-21 RX ORDER — ENOXAPARIN SODIUM 150 MG/ML
150 INJECTION SUBCUTANEOUS TWICE DAILY
Qty: 12 | Refills: 0 | Status: DISCONTINUED | COMMUNITY
Start: 2018-06-08 | End: 2019-05-21

## 2019-05-21 RX ORDER — TADALAFIL 20 MG/1
20 TABLET, FILM COATED ORAL
Qty: 6 | Refills: 0 | Status: DISCONTINUED | COMMUNITY
Start: 2017-12-07 | End: 2019-05-21

## 2019-05-21 NOTE — HISTORY OF PRESENT ILLNESS
[FreeTextEntry1] : Followup evaluation [de-identified] : Mr. Quintero presents for followup evaluation accompanied by his wife. He denies any chest pain or palpitations. He is on chronic Coumadin therapy for history of atrial fibrillation as well as previous DVT. He has no nocturnal symptoms of cough or shortness of breath. Patient denies any hematuria or dysuria.

## 2019-05-21 NOTE — PLAN
[FreeTextEntry1] : Mr. Quintero presents for followup evaluation. He will continue on current medication regimen.Comprehensive blood profile was reviewed with patient. He will followup as scheduled.

## 2019-06-24 ENCOUNTER — RX RENEWAL (OUTPATIENT)
Age: 79
End: 2019-06-24

## 2019-07-01 LAB
INR PPP: 2.41 RATIO
PT BLD: 28.5 SEC

## 2019-07-22 ENCOUNTER — RX RENEWAL (OUTPATIENT)
Age: 79
End: 2019-07-22

## 2019-08-21 ENCOUNTER — LABORATORY RESULT (OUTPATIENT)
Age: 79
End: 2019-08-21

## 2019-08-22 ENCOUNTER — MEDICATION RENEWAL (OUTPATIENT)
Age: 79
End: 2019-08-22

## 2019-08-22 ENCOUNTER — RX RENEWAL (OUTPATIENT)
Age: 79
End: 2019-08-22

## 2019-09-05 ENCOUNTER — LABORATORY RESULT (OUTPATIENT)
Age: 79
End: 2019-09-05

## 2019-10-17 ENCOUNTER — LABORATORY RESULT (OUTPATIENT)
Age: 79
End: 2019-10-17

## 2019-11-08 ENCOUNTER — LABORATORY RESULT (OUTPATIENT)
Age: 79
End: 2019-11-08

## 2019-11-08 LAB
25(OH)D3 SERPL-MCNC: 39.5 NG/ML
ALBUMIN SERPL ELPH-MCNC: 4.4 G/DL
ALP BLD-CCNC: 87 U/L
ALT SERPL-CCNC: 20 U/L
ANION GAP SERPL CALC-SCNC: 14 MMOL/L
APPEARANCE: CLEAR
AST SERPL-CCNC: 26 U/L
BACTERIA: NEGATIVE
BASOPHILS # BLD AUTO: 0.04 K/UL
BASOPHILS NFR BLD AUTO: 0.6 %
BILIRUB SERPL-MCNC: 0.7 MG/DL
BILIRUBIN URINE: NEGATIVE
BLOOD URINE: NEGATIVE
BUN SERPL-MCNC: 30 MG/DL
CALCIUM SERPL-MCNC: 9.5 MG/DL
CHLORIDE SERPL-SCNC: 105 MMOL/L
CHOLEST SERPL-MCNC: 156 MG/DL
CHOLEST/HDLC SERPL: 2.5 RATIO
CO2 SERPL-SCNC: 26 MMOL/L
COLOR: YELLOW
CREAT SERPL-MCNC: 1.11 MG/DL
EOSINOPHIL # BLD AUTO: 0.22 K/UL
EOSINOPHIL NFR BLD AUTO: 3.3 %
FERRITIN SERPL-MCNC: 297 NG/ML
FOLATE SERPL-MCNC: >20 NG/ML
GLUCOSE QUALITATIVE U: NEGATIVE
GLUCOSE SERPL-MCNC: 95 MG/DL
HCT VFR BLD CALC: 46.7 %
HCV AB SER QL: NONREACTIVE
HCV S/CO RATIO: 0.13 S/CO
HDLC SERPL-MCNC: 63 MG/DL
HGB BLD-MCNC: 14.7 G/DL
HYALINE CASTS: 0 /LPF
IMM GRANULOCYTES NFR BLD AUTO: 0.6 %
IRON SATN MFR SERPL: 47 %
IRON SERPL-MCNC: 62 UG/DL
KETONES URINE: NEGATIVE
LDLC SERPL CALC-MCNC: 82 MG/DL
LEUKOCYTE ESTERASE URINE: NEGATIVE
LYMPHOCYTES # BLD AUTO: 1.17 K/UL
LYMPHOCYTES NFR BLD AUTO: 17.6 %
MAN DIFF?: NORMAL
MCHC RBC-ENTMCNC: 29.2 PG
MCHC RBC-ENTMCNC: 31.5 GM/DL
MCV RBC AUTO: 92.7 FL
MICROSCOPIC-UA: NORMAL
MONOCYTES # BLD AUTO: 0.48 K/UL
MONOCYTES NFR BLD AUTO: 7.2 %
NEUTROPHILS # BLD AUTO: 4.68 K/UL
NEUTROPHILS NFR BLD AUTO: 70.7 %
NITRITE URINE: NEGATIVE
PH URINE: 6
PLATELET # BLD AUTO: 173 K/UL
POTASSIUM SERPL-SCNC: 4.5 MMOL/L
PROT SERPL-MCNC: 6.5 G/DL
PROTEIN URINE: NEGATIVE
PSA SERPL-MCNC: 1.55 NG/ML
RBC # BLD: 5.04 M/UL
RBC # FLD: 13.3 %
RED BLOOD CELLS URINE: 2 /HPF
SODIUM SERPL-SCNC: 145 MMOL/L
SPECIFIC GRAVITY URINE: 1.02
SQUAMOUS EPITHELIAL CELLS: 0 /HPF
T3FREE SERPL-MCNC: 2.73 PG/ML
T3RU NFR SERPL: 1.1 TBI
T4 FREE SERPL-MCNC: 1.2 NG/DL
T4 SERPL-MCNC: 7.7 UG/DL
TIBC SERPL-MCNC: 133 UG/DL
TRANSFERRIN SERPL-MCNC: 100 MG/DL
TRIGL SERPL-MCNC: 53 MG/DL
UIBC SERPL-MCNC: 71 UG/DL
UROBILINOGEN URINE: NORMAL
VIT B12 SERPL-MCNC: 800 PG/ML
WBC # FLD AUTO: 6.63 K/UL
WHITE BLOOD CELLS URINE: 0 /HPF

## 2019-11-14 ENCOUNTER — APPOINTMENT (OUTPATIENT)
Dept: INTERNAL MEDICINE | Facility: CLINIC | Age: 79
End: 2019-11-14

## 2019-11-20 ENCOUNTER — RX RENEWAL (OUTPATIENT)
Age: 79
End: 2019-11-20

## 2019-11-26 ENCOUNTER — APPOINTMENT (OUTPATIENT)
Dept: INTERNAL MEDICINE | Facility: CLINIC | Age: 79
End: 2019-11-26
Payer: MEDICARE

## 2019-11-26 VITALS
HEIGHT: 70 IN | OXYGEN SATURATION: 96 % | TEMPERATURE: 98.5 F | WEIGHT: 304 LBS | HEART RATE: 72 BPM | RESPIRATION RATE: 20 BRPM | SYSTOLIC BLOOD PRESSURE: 138 MMHG | DIASTOLIC BLOOD PRESSURE: 84 MMHG | BODY MASS INDEX: 43.52 KG/M2

## 2019-11-26 DIAGNOSIS — Z23 ENCOUNTER FOR IMMUNIZATION: ICD-10-CM

## 2019-11-26 PROCEDURE — G0438: CPT

## 2019-11-26 PROCEDURE — 90732 PPSV23 VACC 2 YRS+ SUBQ/IM: CPT

## 2019-11-26 PROCEDURE — G0009: CPT

## 2019-11-26 RX ORDER — VALSARTAN 80 MG/1
80 TABLET ORAL
Refills: 0 | Status: DISCONTINUED | COMMUNITY
End: 2019-11-26

## 2019-11-26 NOTE — HISTORY OF PRESENT ILLNESS
[FreeTextEntry1] : CPE [de-identified] : Pt here for yearly . BRYCE simons feels well overall. He lost 80 pounds over the last 18 months by watching his diet closely and walking. He has a history of morbid obesity, obstructive sleep apnea, Afib ( on long term Coumadin) , asthma, GERD , BPH. He is compliant  with CPAP. he stopped Symbicort on his own last year and has not had to use a rescue inhaler. He notes allergic rhinitis symptoms in the Fall and SPring and takes Claritin as needed.   \par He had a cardiac workup with Dr. David last month.  Per pt echo , stress test and carotid artery  were normal . \par Colonoscopy was performed 2018 by Dr. Stokes  after experiencing rectal bleeding. It was non contributory. \par He remains on Coumadin , last INR 11/8/19 was 2.5. he tests monthly. \par He denies fever, chills, abdominal pain,  pleuritic pain, cough, wheeze, sputum production.

## 2019-11-26 NOTE — HEALTH RISK ASSESSMENT
[] : No [Good] : ~his/her~  mood as  good [No] : No [No falls in past year] : Patient reported no falls in the past year [Audit-CScore] : 0 [0] : 2) Feeling down, depressed, or hopeless: Not at all (0) [de-identified] : walking [de-identified] : lowe fat. low cholesterol  [[id number]] : 0 [Change in mental status noted] : No change in mental status noted [Hepatitis C test offered] : Hepatitis C test offered [With Significant Other] : lives with significant other [None] : None [] :  [Retired] : retired [Fully functional (bathing, dressing, toileting, transferring, walking, feeding)] : Fully functional (bathing, dressing, toileting, transferring, walking, feeding) [Fully functional (using the telephone, shopping, preparing meals, housekeeping, doing laundry, using] : Fully functional and needs no help or supervision to perform IADLs (using the telephone, shopping, preparing meals, housekeeping, doing laundry, using transportation, managing medications and managing finances) [Reports changes in vision] : Reports no changes in vision [Reports changes in hearing] : Reports no changes in hearing [Reports changes in dental health] : Reports no changes in dental health [HepatitisCDate] : 11/2019 [HepatitisCComments] : non reactive

## 2019-11-26 NOTE — REVIEW OF SYSTEMS
[Chills] : no chills [Fever] : no fever [Fatigue] : no fatigue [Shortness Of Breath] : no shortness of breath [Wheezing] : no wheezing [Dyspnea on Exertion] : not dyspnea on exertion [Cough] : no cough [Negative] : Neurological

## 2019-11-26 NOTE — PHYSICAL EXAM
[No Acute Distress] : no acute distress [Well Nourished] : well nourished [Well Developed] : well developed [Normal Oropharynx] : the oropharynx was normal [Normal TMs] : both tympanic membranes were normal [No Lymphadenopathy] : no lymphadenopathy [No Respiratory Distress] : no respiratory distress  [Supple] : supple [No Accessory Muscle Use] : no accessory muscle use [Clear to Auscultation] : lungs were clear to auscultation bilaterally [Normal Rate] : normal rate  [Regular Rhythm] : with a regular rhythm [Normal S1, S2] : normal S1 and S2 [Pedal Pulses Present] : the pedal pulses are present [Soft] : abdomen soft [Non Tender] : non-tender [Non-distended] : non-distended [Normal Bowel Sounds] : normal bowel sounds [Normal Posterior Cervical Nodes] : no posterior cervical lymphadenopathy [No Joint Swelling] : no joint swelling [Normal Anterior Cervical Nodes] : no anterior cervical lymphadenopathy [Coordination Grossly Intact] : coordination grossly intact [Grossly Normal Strength/Tone] : grossly normal strength/tone [No Focal Deficits] : no focal deficits [Normal Affect] : the affect was normal [Normal Insight/Judgement] : insight and judgment were intact [Alert and Oriented x3] : oriented to person, place, and time [de-identified] : +1/2 bilateral pedal edema  [de-identified] : uses cane for support

## 2019-12-10 LAB
ESTIMATED AVERAGE GLUCOSE: 108 MG/DL
HBA1C MFR BLD HPLC: 5.4 %
INR PPP: 2.71 RATIO
PT BLD: 31.9 SEC

## 2020-01-08 ENCOUNTER — RX RENEWAL (OUTPATIENT)
Age: 80
End: 2020-01-08

## 2020-01-10 NOTE — ED PROVIDER NOTE - NS_ATTENDINGSCRIBE_ED_ALL_ED
POST INJECTION EVALUATION, no signs of new infection, tear, RD, VF, EOM, CNS, Vascular or other problems or side effect from previous injection(s). I personally performed the service described in the documentation recorded by the scribe in my presence, and it accurately and completely records my words and actions.

## 2020-02-11 LAB
INR PPP: 2.4 RATIO
PT BLD: 27.9 SEC

## 2020-04-19 DIAGNOSIS — Z00.00 ENCOUNTER FOR GENERAL ADULT MEDICAL EXAMINATION W/OUT ABNORMAL FINDINGS: ICD-10-CM

## 2020-04-22 LAB
INR PPP: 2.64 RATIO
PT BLD: 30.7 SEC

## 2020-05-04 ENCOUNTER — RX RENEWAL (OUTPATIENT)
Age: 80
End: 2020-05-04

## 2020-05-06 RX ORDER — ALBUTEROL SULFATE 90 UG/1
108 (90 BASE) AEROSOL, METERED RESPIRATORY (INHALATION)
Qty: 3 | Refills: 0 | Status: DISCONTINUED | COMMUNITY
Start: 2020-04-21 | End: 2020-05-06

## 2020-05-21 LAB
ALBUMIN SERPL ELPH-MCNC: 4.6 G/DL
ALP BLD-CCNC: 91 U/L
ALT SERPL-CCNC: 23 U/L
ANION GAP SERPL CALC-SCNC: 12 MMOL/L
APPEARANCE: CLEAR
AST SERPL-CCNC: 28 U/L
BACTERIA: NEGATIVE
BASOPHILS # BLD AUTO: 0.06 K/UL
BASOPHILS NFR BLD AUTO: 1 %
BILIRUB SERPL-MCNC: 0.5 MG/DL
BILIRUBIN URINE: NEGATIVE
BLOOD URINE: NEGATIVE
BUN SERPL-MCNC: 31 MG/DL
CALCIUM SERPL-MCNC: 9.1 MG/DL
CHLORIDE SERPL-SCNC: 107 MMOL/L
CHOLEST SERPL-MCNC: 130 MG/DL
CHOLEST/HDLC SERPL: 2.2 RATIO
CO2 SERPL-SCNC: 26 MMOL/L
COLOR: YELLOW
CREAT SERPL-MCNC: 1.11 MG/DL
EOSINOPHIL # BLD AUTO: 0.2 K/UL
EOSINOPHIL NFR BLD AUTO: 3.2 %
GLUCOSE QUALITATIVE U: NEGATIVE
GLUCOSE SERPL-MCNC: 98 MG/DL
HCT VFR BLD CALC: 45.2 %
HDLC SERPL-MCNC: 59 MG/DL
HGB BLD-MCNC: 14.1 G/DL
HYALINE CASTS: 0 /LPF
IMM GRANULOCYTES NFR BLD AUTO: 0.5 %
INR PPP: 2.32 RATIO
IRON SERPL-MCNC: 42 UG/DL
KETONES URINE: NEGATIVE
LDLC SERPL CALC-MCNC: 61 MG/DL
LEUKOCYTE ESTERASE URINE: NEGATIVE
LYMPHOCYTES # BLD AUTO: 1.08 K/UL
LYMPHOCYTES NFR BLD AUTO: 17.3 %
MAN DIFF?: NORMAL
MCHC RBC-ENTMCNC: 29.3 PG
MCHC RBC-ENTMCNC: 31.2 GM/DL
MCV RBC AUTO: 94 FL
MICROSCOPIC-UA: NORMAL
MONOCYTES # BLD AUTO: 0.52 K/UL
MONOCYTES NFR BLD AUTO: 8.3 %
NEUTROPHILS # BLD AUTO: 4.36 K/UL
NEUTROPHILS NFR BLD AUTO: 69.7 %
NITRITE URINE: NEGATIVE
PH URINE: 6
PLATELET # BLD AUTO: 145 K/UL
POTASSIUM SERPL-SCNC: 4.3 MMOL/L
PROT SERPL-MCNC: 6.2 G/DL
PROTEIN URINE: NORMAL
PSA SERPL-MCNC: 1.64 NG/ML
PT BLD: 27.2 SEC
RBC # BLD: 4.81 M/UL
RBC # FLD: 13.6 %
RED BLOOD CELLS URINE: 1 /HPF
SODIUM SERPL-SCNC: 144 MMOL/L
SPECIFIC GRAVITY URINE: 1.02
SQUAMOUS EPITHELIAL CELLS: 0 /HPF
TRIGL SERPL-MCNC: 49 MG/DL
TSH SERPL-ACNC: 3.76 UIU/ML
UROBILINOGEN URINE: NORMAL
WBC # FLD AUTO: 6.25 K/UL
WHITE BLOOD CELLS URINE: 1 /HPF

## 2020-05-27 ENCOUNTER — APPOINTMENT (OUTPATIENT)
Dept: INTERNAL MEDICINE | Facility: CLINIC | Age: 80
End: 2020-05-27
Payer: MEDICARE

## 2020-05-27 VITALS
DIASTOLIC BLOOD PRESSURE: 64 MMHG | HEIGHT: 70 IN | WEIGHT: 296 LBS | HEART RATE: 68 BPM | SYSTOLIC BLOOD PRESSURE: 142 MMHG | TEMPERATURE: 97.9 F | BODY MASS INDEX: 42.37 KG/M2 | OXYGEN SATURATION: 97 % | RESPIRATION RATE: 20 BRPM

## 2020-05-27 DIAGNOSIS — Z87.898 PERSONAL HISTORY OF OTHER SPECIFIED CONDITIONS: ICD-10-CM

## 2020-05-27 PROCEDURE — 99214 OFFICE O/P EST MOD 30 MIN: CPT

## 2020-05-27 NOTE — HISTORY OF PRESENT ILLNESS
[FreeTextEntry1] : Followup [de-identified] : Mr. Quintero presents for a followup evaluation. He remains morbidly obese, however, he has lost weight on the Atkins diet. He is complaining of episodes of shortness of breath when he bends over. Mr. Quintero is also having some episodes of dizziness and shortness of breath with exertion. The symptoms do resolve with rest. He is followed by Dr. David for cardiology. He denies any nocturnal symptoms of cough or dyspnea. The patient has no hematuria or dysuria.

## 2020-05-27 NOTE — PLAN
[FreeTextEntry1] : Mr. Quintero presents for a followup evaluation. Comprehensive blood profile was reviewed with the patient. He will continue on current medication regimen which has been reviewed and revised.followup in 6 months.

## 2020-05-27 NOTE — PHYSICAL EXAM
[Well Nourished] : well nourished [No Acute Distress] : no acute distress [Well Developed] : well developed [Well-Appearing] : well-appearing [EOMI] : extraocular movements intact [Normal Sclera/Conjunctiva] : normal sclera/conjunctiva [PERRL] : pupils equal round and reactive to light [No JVD] : no jugular venous distention [Normal Oropharynx] : the oropharynx was normal [Normal Outer Ear/Nose] : the outer ears and nose were normal in appearance [No Lymphadenopathy] : no lymphadenopathy [Supple] : supple [No Respiratory Distress] : no respiratory distress  [Thyroid Normal, No Nodules] : the thyroid was normal and there were no nodules present [No Accessory Muscle Use] : no accessory muscle use [Clear to Auscultation] : lungs were clear to auscultation bilaterally [Regular Rhythm] : with a regular rhythm [Normal Rate] : normal rate  [No Murmur] : no murmur heard [Normal S1, S2] : normal S1 and S2 [No Carotid Bruits] : no carotid bruits [No Abdominal Bruit] : a ~M bruit was not heard ~T in the abdomen [No Varicosities] : no varicosities [No Edema] : there was no peripheral edema [Pedal Pulses Present] : the pedal pulses are present [No Palpable Aorta] : no palpable aorta [Soft] : abdomen soft [No Extremity Clubbing/Cyanosis] : no extremity clubbing/cyanosis [Non Tender] : non-tender [No Masses] : no abdominal mass palpated [Non-distended] : non-distended [No HSM] : no HSM [Normal Bowel Sounds] : normal bowel sounds [Normal Posterior Cervical Nodes] : no posterior cervical lymphadenopathy [No CVA Tenderness] : no CVA  tenderness [Normal Anterior Cervical Nodes] : no anterior cervical lymphadenopathy [No Spinal Tenderness] : no spinal tenderness [No Rash] : no rash [No Joint Swelling] : no joint swelling [Grossly Normal Strength/Tone] : grossly normal strength/tone [No Focal Deficits] : no focal deficits [Coordination Grossly Intact] : coordination grossly intact [Deep Tendon Reflexes (DTR)] : deep tendon reflexes were 2+ and symmetric [Normal Gait] : normal gait [Normal Affect] : the affect was normal [Normal Insight/Judgement] : insight and judgment were intact [de-identified] : Morbidly obese

## 2020-06-10 ENCOUNTER — RX RENEWAL (OUTPATIENT)
Age: 80
End: 2020-06-10

## 2020-07-07 ENCOUNTER — RX RENEWAL (OUTPATIENT)
Age: 80
End: 2020-07-07

## 2020-07-29 LAB
INR PPP: 2.56 RATIO
PT BLD: 28.9 SEC

## 2020-08-12 ENCOUNTER — RX RENEWAL (OUTPATIENT)
Age: 80
End: 2020-08-12

## 2020-10-05 LAB
INR PPP: 2.41 RATIO
PT BLD: 27.5 SEC

## 2020-11-09 ENCOUNTER — RX RENEWAL (OUTPATIENT)
Age: 80
End: 2020-11-09

## 2020-11-17 ENCOUNTER — APPOINTMENT (OUTPATIENT)
Dept: INTERNAL MEDICINE | Facility: CLINIC | Age: 80
End: 2020-11-17

## 2020-11-19 LAB
ALBUMIN SERPL ELPH-MCNC: 4.8 G/DL
ALP BLD-CCNC: 106 U/L
ALT SERPL-CCNC: 19 U/L
ANION GAP SERPL CALC-SCNC: 11 MMOL/L
APPEARANCE: CLEAR
APTT BLD: 42.3 SEC
AST SERPL-CCNC: 27 U/L
BACTERIA: NEGATIVE
BASOPHILS # BLD AUTO: 0.04 K/UL
BASOPHILS NFR BLD AUTO: 0.6 %
BILIRUB SERPL-MCNC: 0.8 MG/DL
BILIRUBIN URINE: NEGATIVE
BLOOD URINE: NEGATIVE
BUN SERPL-MCNC: 19 MG/DL
CALCIUM SERPL-MCNC: 9.4 MG/DL
CHLORIDE SERPL-SCNC: 105 MMOL/L
CHOLEST SERPL-MCNC: 144 MG/DL
CO2 SERPL-SCNC: 25 MMOL/L
COLOR: YELLOW
CREAT SERPL-MCNC: 1.06 MG/DL
EOSINOPHIL # BLD AUTO: 0.23 K/UL
EOSINOPHIL NFR BLD AUTO: 3.3 %
ESTIMATED AVERAGE GLUCOSE: 114 MG/DL
GLUCOSE QUALITATIVE U: NEGATIVE
GLUCOSE SERPL-MCNC: 94 MG/DL
HBA1C MFR BLD HPLC: 5.6 %
HCT VFR BLD CALC: 46 %
HDLC SERPL-MCNC: 63 MG/DL
HGB BLD-MCNC: 14.6 G/DL
HYALINE CASTS: 0 /LPF
IMM GRANULOCYTES NFR BLD AUTO: 0.6 %
INR PPP: 2.42 RATIO
IRON SERPL-MCNC: 64 UG/DL
KETONES URINE: NEGATIVE
LDLC SERPL CALC-MCNC: 69 MG/DL
LEUKOCYTE ESTERASE URINE: NEGATIVE
LYMPHOCYTES # BLD AUTO: 1.2 K/UL
LYMPHOCYTES NFR BLD AUTO: 17 %
MAN DIFF?: NORMAL
MCHC RBC-ENTMCNC: 29.4 PG
MCHC RBC-ENTMCNC: 31.7 GM/DL
MCV RBC AUTO: 92.7 FL
MICROSCOPIC-UA: NORMAL
MONOCYTES # BLD AUTO: 0.48 K/UL
MONOCYTES NFR BLD AUTO: 6.8 %
NEUTROPHILS # BLD AUTO: 5.08 K/UL
NEUTROPHILS NFR BLD AUTO: 71.7 %
NITRITE URINE: NEGATIVE
NONHDLC SERPL-MCNC: 82 MG/DL
PH URINE: 6
PLATELET # BLD AUTO: 153 K/UL
POTASSIUM SERPL-SCNC: 4 MMOL/L
PROT SERPL-MCNC: 6.5 G/DL
PROTEIN URINE: NORMAL
PSA SERPL-MCNC: 1.75 NG/ML
PT BLD: 27.4 SEC
RBC # BLD: 4.96 M/UL
RBC # FLD: 13.7 %
RED BLOOD CELLS URINE: 1 /HPF
SODIUM SERPL-SCNC: 142 MMOL/L
SPECIFIC GRAVITY URINE: 1.02
SQUAMOUS EPITHELIAL CELLS: 0 /HPF
TRIGL SERPL-MCNC: 66 MG/DL
TSH SERPL-ACNC: 3.97 UIU/ML
UROBILINOGEN URINE: NORMAL
WBC # FLD AUTO: 7.07 K/UL
WHITE BLOOD CELLS URINE: 1 /HPF

## 2020-11-20 ENCOUNTER — NON-APPOINTMENT (OUTPATIENT)
Age: 80
End: 2020-11-20

## 2021-01-05 ENCOUNTER — RX RENEWAL (OUTPATIENT)
Age: 81
End: 2021-01-05

## 2021-02-05 ENCOUNTER — RX RENEWAL (OUTPATIENT)
Age: 81
End: 2021-02-05

## 2021-03-09 LAB
INR PPP: 2.31 RATIO
PT BLD: 26.2 SEC

## 2021-03-25 LAB
ALBUMIN SERPL ELPH-MCNC: 4.6 G/DL
ALP BLD-CCNC: 96 U/L
ALT SERPL-CCNC: 10 U/L
ANION GAP SERPL CALC-SCNC: 12 MMOL/L
APPEARANCE: CLEAR
AST SERPL-CCNC: 20 U/L
BACTERIA: NEGATIVE
BASOPHILS # BLD AUTO: 0.06 K/UL
BASOPHILS NFR BLD AUTO: 0.9 %
BILIRUB SERPL-MCNC: 0.9 MG/DL
BILIRUBIN URINE: NEGATIVE
BLOOD URINE: NEGATIVE
BUN SERPL-MCNC: 28 MG/DL
CALCIUM SERPL-MCNC: 9.4 MG/DL
CHLORIDE SERPL-SCNC: 104 MMOL/L
CHOLEST SERPL-MCNC: 137 MG/DL
CO2 SERPL-SCNC: 25 MMOL/L
COLOR: YELLOW
CREAT SERPL-MCNC: 1.22 MG/DL
EOSINOPHIL # BLD AUTO: 0.18 K/UL
EOSINOPHIL NFR BLD AUTO: 2.7 %
ESTIMATED AVERAGE GLUCOSE: 111 MG/DL
GLUCOSE QUALITATIVE U: NEGATIVE
GLUCOSE SERPL-MCNC: 101 MG/DL
HBA1C MFR BLD HPLC: 5.5 %
HCT VFR BLD CALC: 45.3 %
HDLC SERPL-MCNC: 57 MG/DL
HGB BLD-MCNC: 14 G/DL
HYALINE CASTS: 0 /LPF
IMM GRANULOCYTES NFR BLD AUTO: 0.4 %
KETONES URINE: NEGATIVE
LDLC SERPL CALC-MCNC: 65 MG/DL
LEUKOCYTE ESTERASE URINE: NEGATIVE
LYMPHOCYTES # BLD AUTO: 1.02 K/UL
LYMPHOCYTES NFR BLD AUTO: 15.1 %
MAN DIFF?: NORMAL
MCHC RBC-ENTMCNC: 28.7 PG
MCHC RBC-ENTMCNC: 30.9 GM/DL
MCV RBC AUTO: 93 FL
MICROSCOPIC-UA: NORMAL
MONOCYTES # BLD AUTO: 0.5 K/UL
MONOCYTES NFR BLD AUTO: 7.4 %
NEUTROPHILS # BLD AUTO: 4.95 K/UL
NEUTROPHILS NFR BLD AUTO: 73.5 %
NITRITE URINE: NEGATIVE
NONHDLC SERPL-MCNC: 79 MG/DL
PH URINE: 6
PLATELET # BLD AUTO: 152 K/UL
POTASSIUM SERPL-SCNC: 5.1 MMOL/L
PROT SERPL-MCNC: 6.5 G/DL
PROTEIN URINE: NORMAL
PSA SERPL-MCNC: 1.7 NG/ML
RBC # BLD: 4.87 M/UL
RBC # FLD: 13.9 %
RED BLOOD CELLS URINE: 2 /HPF
SODIUM SERPL-SCNC: 141 MMOL/L
SPECIFIC GRAVITY URINE: 1.03
SQUAMOUS EPITHELIAL CELLS: 0 /HPF
TRIGL SERPL-MCNC: 69 MG/DL
TSH SERPL-ACNC: 3.93 UIU/ML
UROBILINOGEN URINE: NORMAL
WBC # FLD AUTO: 6.74 K/UL
WHITE BLOOD CELLS URINE: 1 /HPF

## 2021-03-29 LAB
INR PPP: 2.91 RATIO
PT BLD: 32.6 SEC

## 2021-04-05 ENCOUNTER — APPOINTMENT (OUTPATIENT)
Dept: INTERNAL MEDICINE | Facility: CLINIC | Age: 81
End: 2021-04-05
Payer: MEDICARE

## 2021-04-05 VITALS
HEART RATE: 48 BPM | RESPIRATION RATE: 18 BRPM | WEIGHT: 310 LBS | TEMPERATURE: 98.1 F | BODY MASS INDEX: 44.38 KG/M2 | DIASTOLIC BLOOD PRESSURE: 60 MMHG | OXYGEN SATURATION: 95 % | SYSTOLIC BLOOD PRESSURE: 144 MMHG | HEIGHT: 70 IN

## 2021-04-05 DIAGNOSIS — I45.2 BIFASCICULAR BLOCK: ICD-10-CM

## 2021-04-05 DIAGNOSIS — Z87.442 PERSONAL HISTORY OF URINARY CALCULI: ICD-10-CM

## 2021-04-05 DIAGNOSIS — Z86.2 PERSONAL HISTORY OF DISEASES OF THE BLOOD AND BLOOD-FORMING ORGANS AND CERTAIN DISORDERS INVOLVING THE IMMUNE MECHANISM: ICD-10-CM

## 2021-04-05 DIAGNOSIS — G24.5 BLEPHAROSPASM: ICD-10-CM

## 2021-04-05 PROCEDURE — 99215 OFFICE O/P EST HI 40 MIN: CPT

## 2021-04-05 PROCEDURE — 99205 OFFICE O/P NEW HI 60 MIN: CPT

## 2021-04-05 RX ORDER — LORATADINE 10 MG/1
10 CAPSULE, LIQUID FILLED ORAL
Refills: 0 | Status: ACTIVE | COMMUNITY

## 2021-04-05 RX ORDER — ALFUZOSIN HCL 10 MG/1
10 TABLET, EXTENDED RELEASE ORAL
Refills: 0 | Status: ACTIVE | COMMUNITY

## 2021-04-05 NOTE — HEALTH RISK ASSESSMENT
[] : No [Yes] : Yes [Monthly or less (1 pt)] : Monthly or less (1 point) [1 or 2 (0 pts)] : 1 or 2 (0 points) [Never (0 pts)] : Never (0 points) [No] : In the past 12 months have you used drugs other than those required for medical reasons? No [1] : 1) Little interest or pleasure doing things for several days (1) [0] : 2) Feeling down, depressed, or hopeless: Not at all (0)

## 2021-04-15 LAB — HEMOCCULT STL QL IA: POSITIVE

## 2021-04-16 ENCOUNTER — NON-APPOINTMENT (OUTPATIENT)
Age: 81
End: 2021-04-16

## 2021-05-03 ENCOUNTER — RX RENEWAL (OUTPATIENT)
Age: 81
End: 2021-05-03

## 2021-05-05 ENCOUNTER — RX RENEWAL (OUTPATIENT)
Age: 81
End: 2021-05-05

## 2021-06-07 LAB
APTT BLD: 42.6 SEC
INR PPP: 2.93 RATIO
PT BLD: 32.8 SEC

## 2021-07-27 ENCOUNTER — RX RENEWAL (OUTPATIENT)
Age: 81
End: 2021-07-27

## 2021-09-13 ENCOUNTER — RX RENEWAL (OUTPATIENT)
Age: 81
End: 2021-09-13

## 2021-09-13 LAB
INR PPP: 1.5 RATIO
PT BLD: 17.4 SEC

## 2021-09-23 LAB
INR PPP: 2.23 RATIO
PT BLD: 25.5 SEC

## 2021-10-04 LAB
ALBUMIN SERPL ELPH-MCNC: 4.6 G/DL
ALP BLD-CCNC: 112 U/L
ALT SERPL-CCNC: 18 U/L
ANION GAP SERPL CALC-SCNC: 11 MMOL/L
AST SERPL-CCNC: 27 U/L
BASOPHILS # BLD AUTO: 0.05 K/UL
BASOPHILS NFR BLD AUTO: 0.8 %
BILIRUB SERPL-MCNC: 0.8 MG/DL
BUN SERPL-MCNC: 24 MG/DL
CALCIUM SERPL-MCNC: 9.3 MG/DL
CHLORIDE SERPL-SCNC: 108 MMOL/L
CHOLEST SERPL-MCNC: 136 MG/DL
CO2 SERPL-SCNC: 27 MMOL/L
CREAT SERPL-MCNC: 1.15 MG/DL
EOSINOPHIL # BLD AUTO: 0.26 K/UL
EOSINOPHIL NFR BLD AUTO: 4.1 %
ESTIMATED AVERAGE GLUCOSE: 117 MG/DL
GLUCOSE SERPL-MCNC: 100 MG/DL
HBA1C MFR BLD HPLC: 5.7 %
HCT VFR BLD CALC: 44.9 %
HDLC SERPL-MCNC: 60 MG/DL
HGB BLD-MCNC: 14.2 G/DL
IMM GRANULOCYTES NFR BLD AUTO: 0.8 %
LDLC SERPL CALC-MCNC: 64 MG/DL
LYMPHOCYTES # BLD AUTO: 1.01 K/UL
LYMPHOCYTES NFR BLD AUTO: 15.8 %
MAN DIFF?: NORMAL
MCHC RBC-ENTMCNC: 29.8 PG
MCHC RBC-ENTMCNC: 31.6 GM/DL
MCV RBC AUTO: 94.1 FL
MONOCYTES # BLD AUTO: 0.52 K/UL
MONOCYTES NFR BLD AUTO: 8.1 %
NEUTROPHILS # BLD AUTO: 4.52 K/UL
NEUTROPHILS NFR BLD AUTO: 70.4 %
NONHDLC SERPL-MCNC: 76 MG/DL
PLATELET # BLD AUTO: 141 K/UL
POTASSIUM SERPL-SCNC: 4.1 MMOL/L
PROT SERPL-MCNC: 6.3 G/DL
RBC # BLD: 4.77 M/UL
RBC # FLD: 14 %
SODIUM SERPL-SCNC: 146 MMOL/L
TRIGL SERPL-MCNC: 58 MG/DL
TSH SERPL-ACNC: 5.02 UIU/ML
WBC # FLD AUTO: 6.41 K/UL

## 2021-10-07 LAB
INR PPP: 1.92 RATIO
PT BLD: 22 SEC

## 2021-10-12 ENCOUNTER — APPOINTMENT (OUTPATIENT)
Dept: INTERNAL MEDICINE | Facility: CLINIC | Age: 81
End: 2021-10-12
Payer: MEDICARE

## 2021-10-12 VITALS
SYSTOLIC BLOOD PRESSURE: 143 MMHG | HEART RATE: 50 BPM | HEIGHT: 70 IN | DIASTOLIC BLOOD PRESSURE: 60 MMHG | TEMPERATURE: 98.3 F | RESPIRATION RATE: 18 BRPM | BODY MASS INDEX: 45.1 KG/M2 | OXYGEN SATURATION: 97 % | WEIGHT: 315 LBS

## 2021-10-12 DIAGNOSIS — D69.6 THROMBOCYTOPENIA, UNSPECIFIED: ICD-10-CM

## 2021-10-12 PROCEDURE — 90662 IIV NO PRSV INCREASED AG IM: CPT

## 2021-10-12 PROCEDURE — G0008: CPT

## 2021-10-12 PROCEDURE — 99214 OFFICE O/P EST MOD 30 MIN: CPT | Mod: 25

## 2021-10-12 RX ORDER — OXYCODONE AND ACETAMINOPHEN 10; 325 MG/1; MG/1
10-325 TABLET ORAL
Qty: 20 | Refills: 0 | Status: ACTIVE | COMMUNITY
Start: 2021-07-29

## 2021-10-12 RX ORDER — SILVER SULFADIAZINE 10 MG/G
1 CREAM TOPICAL
Qty: 50 | Refills: 0 | Status: ACTIVE | COMMUNITY
Start: 2021-07-20

## 2021-10-12 NOTE — HISTORY OF PRESENT ILLNESS
[FreeTextEntry1] : annual followup evaluation [de-identified] : Mr. Quintero presents for annual followup evaluation. Feeling well. Has a history of obstructive sleep apnea continues on BiPAP therapy. He is significantly obese. HEENT denies any nocturnal symptoms of cough or dyspnea. There is no hematuria or dysuria. Patient continues on Coumadin therapy for history bilateral pulmonary emboli. He does have an IVC filter in.

## 2021-10-12 NOTE — HISTORY OF PRESENT ILLNESS
[FreeTextEntry1] : annual followup evaluation [de-identified] : Mr. Quintero presents for annual followup evaluation. Feeling well. Has a history of obstructive sleep apnea continues on BiPAP therapy. He is significantly obese. HEENT denies any nocturnal symptoms of cough or dyspnea. There is no hematuria or dysuria. Patient continues on Coumadin therapy for history bilateral pulmonary emboli. He does have an IVC filter in.

## 2021-10-12 NOTE — HISTORY OF PRESENT ILLNESS
[FreeTextEntry1] : Followup [de-identified] : Mr. Quintero presents for followup evaluation. He is mild shortness of breath with exertion. Patient denies any chest pain or palpitations. He continues on Coumadin therapy.

## 2021-10-12 NOTE — PLAN
[FreeTextEntry1] : Mr. Quintero presents for Annual followup evaluation. He will continue with her medication regimen. He will switch to do up BiPAP therapy. Mr. 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 He will receive his first rotavirus vaccination with the Moderna vaccine.

## 2021-10-12 NOTE — PLAN
[FreeTextEntry1] : Mr. Quintero presents for a followup evaluation. He will continue on current medication regimen which has been reviewed. Patient will have a PT/INR drawn today. Orders have been put in for a PT/INR every 4 weeks and as needed. Patient will continue on current medication regimen. He has been given a prescription for CBC, CMP plus hemoglobin A1c, iron level, lipid profile, PSA level, TSH and urinalysis. He will receive the flu vaccine.Followup in 6 months.

## 2021-10-13 LAB
INR PPP: 2.42 RATIO
PT BLD: 27.4 SEC

## 2021-11-10 NOTE — ASSESSMENT
[FreeTextEntry1] : script for HBG A1 C given\par Continue low fat/low cholesterol diet \par Walk/ exercise as tolerated \par Continue current medications  \par F/up Cardiology , Dr. David \par Dental/ eye exam yearly \par PT/INR monthly , f/up with results  \par Pneumovax 23 administered today \par Overdue for PFT, schedule  for  next visit, Dr. Carson 6 months / sooner as needed 
no

## 2021-11-29 LAB
INR PPP: 2.14 RATIO
PT BLD: 24.4 SEC

## 2021-12-13 ENCOUNTER — RX RENEWAL (OUTPATIENT)
Age: 81
End: 2021-12-13

## 2021-12-22 ENCOUNTER — APPOINTMENT (OUTPATIENT)
Dept: UROLOGY | Facility: CLINIC | Age: 81
End: 2021-12-22
Payer: MEDICARE

## 2021-12-22 VITALS
WEIGHT: 315 LBS | HEART RATE: 48 BPM | HEIGHT: 69 IN | SYSTOLIC BLOOD PRESSURE: 165 MMHG | BODY MASS INDEX: 46.65 KG/M2 | OXYGEN SATURATION: 97 % | DIASTOLIC BLOOD PRESSURE: 74 MMHG

## 2021-12-22 DIAGNOSIS — R32 UNSPECIFIED URINARY INCONTINENCE: ICD-10-CM

## 2021-12-22 DIAGNOSIS — R35.1 BENIGN PROSTATIC HYPERPLASIA WITH LOWER URINARY TRACT SYMPMS: ICD-10-CM

## 2021-12-22 DIAGNOSIS — N40.1 BENIGN PROSTATIC HYPERPLASIA WITH LOWER URINARY TRACT SYMPMS: ICD-10-CM

## 2021-12-22 DIAGNOSIS — Z87.442 PERSONAL HISTORY OF URINARY CALCULI: ICD-10-CM

## 2021-12-22 PROCEDURE — 99204 OFFICE O/P NEW MOD 45 MIN: CPT | Mod: 25

## 2021-12-22 PROCEDURE — 51798 US URINE CAPACITY MEASURE: CPT

## 2021-12-22 NOTE — PHYSICAL EXAM
[Normal Appearance] : normal appearance [General Appearance - In No Acute Distress] : no acute distress [] : no respiratory distress [Abdomen Soft] : soft [Abdomen Tenderness] : non-tender [Costovertebral Angle Tenderness] : no ~M costovertebral angle tenderness [Urethral Meatus] : meatus normal [Penis Abnormality] : normal circumcised penis [Scrotum] : the scrotum was normal [Epididymis] : the epididymides were normal [Testes Tenderness] : no tenderness of the testes [Testes Mass (___cm)] : there were no testicular masses [Prostate Tenderness] : the prostate was not tender [No Prostate Nodules] : no prostate nodules [Prostate Size ___ (0-4)] : prostate size [unfilled] (scale: 0-4) [Skin Color & Pigmentation] : normal skin color and pigmentation [No Focal Deficits] : no focal deficits [Oriented To Time, Place, And Person] : oriented to person, place, and time [FreeTextEntry1] : walks with walker

## 2021-12-22 NOTE — HISTORY OF PRESENT ILLNESS
[FreeTextEntry1] : 80 year old male presents for history of Benign Prostatic Hyperplasia. \par Saw Dr Garcia and Rosita in the past. \par On Alfuzosin and Avodart. \par On Lasix, takes at 1.30p, urinates frequently requiring pads, changes 3 pads. Starts around 4p to 9p. No nocturia. \par Urinates few times before Lasix. Sometimes stands and sometimes sits to urinate. \par Has variable stream, occasional sense of incomplete emptying.\par Denies hesitancy, straining.\par Denies dysuria, hematuria, lower abdominal or flank pain, fever, chills or rigors.\par Not sexually active. Has Erectile dysfunction. \par Family history of Prostate cancer: Brother. \par History of kidney stone. \par

## 2021-12-22 NOTE — ASSESSMENT
[FreeTextEntry1] : Benign Prostatic Hyperplasia:\par Urinary incontinence:\par PVR: 0 ml. \par Will get Urinalysis with reflex Urine culture. \par Discussed treatment options. \par Will continue Alfuzosin and Avodart. \par \par History of kidney stone:\par Discussed kidney stone preventions diet. \par \par Erectile dysfunction:\par Reviewed pathophysiology and differential diagnosis of erectile dysfunction with the patient. Discussed lifestyle changes. \par The patient was made aware that the current therapies for erectile dysfunction consisting of oral phosphodiesterase type 5 inhibitors, intra-urethral alprostadil, intracavernous vasoactive drug injection, vacuum constriction devices and penile prosthesis implantation. Relative risks and benefits, were discussed. All questions were answered.\par Will need Cardiology clearance. \par \par Renal and Bladder Ultrasound before follow up appointment \par Return to office in 6 months or sooner if any issues. \par \par

## 2021-12-22 NOTE — REVIEW OF SYSTEMS
[Feeling Tired] : feeling tired [Eyesight Problems] : eyesight problems [Dry Eyes] : dryness of the eyes [Wheezing] : wheezing [No erections] : no erections [Joint Pain] : joint pain [Difficulty Walking] : difficulty walking [Muscle Weakness] : muscle weakness [see HPI] : see HPI [Easy Bruising] : a tendency for easy bruising [Negative] : Psychiatric [FreeTextEntry2] : frequency 5 [FreeTextEntry3] : dribbling of urine

## 2021-12-23 LAB
APPEARANCE: CLEAR
BILIRUBIN URINE: NEGATIVE
BLOOD URINE: NEGATIVE
COLOR: YELLOW
GLUCOSE QUALITATIVE U: NEGATIVE
KETONES URINE: NEGATIVE
LEUKOCYTE ESTERASE URINE: NEGATIVE
NITRITE URINE: NEGATIVE
PH URINE: 6
PROTEIN URINE: NORMAL
SPECIFIC GRAVITY URINE: 1.03
UROBILINOGEN URINE: ABNORMAL

## 2022-01-27 NOTE — ED ADULT NURSE NOTE - NS PRO AD BILL OF RIGHTS
[facility]  eMERGENCY dEPARTMENT eNCOUnter      Pt Name: La Wright  MRN: [id number]  [place] 1977  Date of evaluation: 1/27/2022  Provider: Josiane Benítez       Chief Complaint   Patient presents with    Chest Pain     burning to left chest         HISTORY OF PRESENT ILLNESS   (Location/Symptom, Timing/Onset,Context/Setting, Quality, Duration, Modifying Factors, Severity)  Note limiting factors. Ramandeep Thompson a 38 y.o. female who presents to the emergency department for evaluation of chest pain. Pt tells me that she has had \"burning\" quality chest pain that began today. She tells me that she had positive covid test on 1/12 continuing to experience problems with nausea, vomiting and diarrhea. She has had shortness of breath and generalized bodyaches. [facility]    Nursing Notes were reviewed. REVIEW OF SYSTEMS    (2-9 systems for level 4, 10 or more for level 5)     Review of Systems   Constitutional: Positive for fatigue. Respiratory: Positive for cough and shortness of breath. Cardiovascular: Positive for chest pain. Gastrointestinal: Positive for abdominal pain, diarrhea, nausea and vomiting. All other systems reviewed and are negative. A complete review of systems was performed and is negative except as noted above in the HPI. PAST MEDICAL HISTORY   History reviewed. No pertinent past medical history. SURGICAL HISTORY       Past Surgical History:   Procedure Laterality Date    CHOLECYSTECTOMY      HYSTERECTOMY      TONSILLECTOMY           CURRENT MEDICATIONS       Previous Medications    SERTRALINE (ZOLOFT) 100 MG TABLET    Take 100 mg by mouth daily       ALLERGIES     Phenergan [promethazine]    FAMILY HISTORY     History reviewed. No pertinent family history.        SOCIAL HISTORY       Social History     Socioeconomic History    Marital status:      Spouse name: None    Number of children: None    Years of education: None    Highest education level: None   Occupational History    None   Tobacco Use    Smoking status: Never Smoker    Smokeless tobacco: Never Used   Substance and Sexual Activity    Alcohol use: Yes     Comment: occasional    Drug use: Not Currently    Sexual activity: None   Other Topics Concern    None   Social History Narrative    None     Social Determinants of Health     Financial Resource Strain:     Difficulty of Paying Living Expenses: Not on file   Food Insecurity:     Worried About Running Out of Food in the Last Year: Not on file    Liliana of Food in the Last Year: Not on file   Transportation Needs:     Lack of Transportation (Medical): Not on file    Lack of Transportation (Non-Medical):  Not on file   Physical Activity:     Days of Exercise per Week: Not on file    Minutes of Exercise per Session: Not on file   Stress:     Feeling of Stress : Not on file   Social Connections:     Frequency of Communication with Friends and Family: Not on file    Frequency of Social Gatherings with Friends and Family: Not on file    Attends Restorationist Services: Not on file    Active Member of [address] [place] [address] or Organizations: Not on file    Attends Club or Organization Meetings: Not on file    Marital Status: Not on file   Intimate Partner Violence:     Fear of Current or Ex-Partner: Not on file    Emotionally Abused: Not on file    Physically Abused: Not on file    Sexually Abused: Not on file   Housing Stability:     Unable to Pay for Housing in the Last Year: Not on file    Number of [place] in the Last Year: Not on file    Unstable Housing in the Last Year: Not on file       SCREENINGS    Tensed Coma Scale  Eye Opening: Spontaneous  Best Verbal Response: Oriented  Best Motor Response: Obeys commands  Jazz Coma Scale Score: 15 Heart Score for chest pain patients  History: Slightly Suspicious  ECG: Normal  Patient Age: < 45 years  *Risk factors for Atherosclerotic disease: Obesity  Risk Factors: 1 or 2 risk factors  Troponin: < 1X normal limit  Heart Score Total: 1      PHYSICAL EXAM    (up to 7 for level 4, 8 or more for level 5)     ED Triage Vitals [01/27/22 1247]   BP Temp Temp src Pulse Resp SpO2 Height Weight   (!) 167/96 98.7 °F (37.1 °C) -- 89 17 97 % 5' 6\" (1.676 m) (!) 310 lb (140.6 kg)       Physical Exam  Vitals reviewed. HENT:      Head: Normocephalic. Right Ear: External ear normal.      Left Ear: External ear normal.   Eyes:      Conjunctiva/sclera: Conjunctivae normal.      Pupils: Pupils are equal, round, and reactive to light. Cardiovascular:      Rate and Rhythm: Normal rate and regular rhythm. Heart sounds: Normal heart sounds. Pulmonary:      Effort: Pulmonary effort is normal.      Breath sounds: Normal breath sounds. Abdominal:      General: Bowel sounds are normal.      Palpations: Abdomen is soft. Tenderness: There is no abdominal tenderness. Musculoskeletal:         General: Normal range of motion. Cervical back: Normal range of motion. Comments: No calf muscle ttp  No obvious lower extremity edema   Skin:     General: Skin is warm and dry. Neurological:      Mental Status: She is alert and oriented to person, place, and time. DIAGNOSTIC RESULTS     EKG: All EKG's are interpreted by the Emergency Department Physician who either signs or Co-signs this chart in the absence of acardiologist.    There is a regular rate and rhythm. SR hr 91b/min Normal NE interval and normal P waves. Normal QRS interval. Normal QT interval. No obvious ST elevation or ST depression. Repeat 2hr ekg There is a regular rate and rhythm. SR hr 97 Normal NE interval and normal P waves. Normal QRS interval. Normal QT interval. No obvious ST elevation or ST depression.        RADIOLOGY:   Non-plain film images such as CT, Ultrasound andMRI are read by the radiologist. Plain radiographic images are visualized and preliminarily interpreted by the emergency physician with the below findings:        Interpretation per the Radiologist below, if available at the time of this note:    XR CHEST PORTABLE   Final Result   Impression:   No acute cardiopulmonary disease. Signed by Dr Lizeth Davis            ED BEDSIDE ULTRASOUND:   Performed by ED Physician - none    LABS:  Labs Reviewed   CBC WITH AUTO DIFFERENTIAL - Abnormal; Notable for the following components:       Result Value    MCHC 31.6 (*)     MPV 9.1 (*)     All other components within normal limits   COMPREHENSIVE METABOLIC PANEL W/ REFLEX TO MG FOR LOW K - Abnormal; Notable for the following components:    ALT 40 (*)     All other components within normal limits   COVID-19, RAPID   CULTURE, STOOL   D-DIMER, QUANTITATIVE   TROPONIN   TROPONIN   URINE RT REFLEX TO CULTURE       All other labs were within normal range or not returned as of this dictation. RE-ASSESSMENT     Pt remains non toxic, well hydrated and in no distress at discharge. EMERGENCY DEPARTMENT COURSE and DIFFERENTIALDIAGNOSIS/MDM:   Vitals:    Vitals:    01/27/22 1400 01/27/22 1430 01/27/22 1500 01/27/22 1600   BP: 126/76 117/76 129/72 100/60   Pulse:       Resp:       Temp:       SpO2: 95% 94% 91% 91%   Weight:       Height:           MDM      CONSULTS:  None    PROCEDURES:  Unless otherwise notedbelow, none     Procedures    FINAL IMPRESSION     1. Chest pain, unspecified type    2. Nausea vomiting and diarrhea          DISPOSITION/PLAN   DISPOSITION Decision To Discharge 01/27/2022 05:17:47 PM      PATIENT REFERRED TO:  [address]   [address] [place] [id number] [phone]  Schedule an appointment as soon as possible for a visit in 3 days  fail to improve      DISCHARGE MEDICATIONS:       Current Discharge Medication List           Medication List      START taking these medications    * ondansetron 4 MG disintegrating tablet  Commonly known as: Zofran ODT  Take 1 tablet by mouth every 8 hours as needed for Nausea or Vomiting     * ondansetron 4 MG disintegrating tablet  Commonly known as: Zofran ODT  Take 1 tablet by mouth every 8 hours as needed for Nausea or Vomiting         * This list has 2 medication(s) that are the same as other medications prescribed for you. Read the directions carefully, and ask your doctor or other care provider to review them with you.             ASK your doctor about these medications    sertraline 100 MG tablet  Commonly known as: ZOLOFT           Where to Get Your Medications      You can get these medications from any pharmacy    Bring a paper prescription for each of these medications  · ondansetron 4 MG disintegrating tablet  · ondansetron 4 MG disintegrating tablet         (Pleasenote that portions of this note were completed with a voice recognition program.  Efforts were made to edit the dictations but occasionally words are mis-transcribed.)              KANDACE Castillo - VIANEY  01/27/22 2886 No

## 2022-03-03 ENCOUNTER — LABORATORY RESULT (OUTPATIENT)
Age: 82
End: 2022-03-03

## 2022-03-04 ENCOUNTER — NON-APPOINTMENT (OUTPATIENT)
Age: 82
End: 2022-03-04

## 2022-03-14 ENCOUNTER — RX RENEWAL (OUTPATIENT)
Age: 82
End: 2022-03-14

## 2022-03-29 ENCOUNTER — NON-APPOINTMENT (OUTPATIENT)
Age: 82
End: 2022-03-29

## 2022-03-29 ENCOUNTER — APPOINTMENT (OUTPATIENT)
Dept: DERMATOLOGY | Facility: CLINIC | Age: 82
End: 2022-03-29
Payer: MEDICARE

## 2022-03-29 DIAGNOSIS — D48.5 NEOPLASM OF UNCERTAIN BEHAVIOR OF SKIN: ICD-10-CM

## 2022-03-29 DIAGNOSIS — L81.4 OTHER MELANIN HYPERPIGMENTATION: ICD-10-CM

## 2022-03-29 DIAGNOSIS — Z12.83 ENCOUNTER FOR SCREENING FOR MALIGNANT NEOPLASM OF SKIN: ICD-10-CM

## 2022-03-29 DIAGNOSIS — D22.9 MELANOCYTIC NEVI, UNSPECIFIED: ICD-10-CM

## 2022-03-29 DIAGNOSIS — L30.0 NUMMULAR DERMATITIS: ICD-10-CM

## 2022-03-29 PROCEDURE — 99204 OFFICE O/P NEW MOD 45 MIN: CPT | Mod: 25

## 2022-03-29 PROCEDURE — 11102 TANGNTL BX SKIN SINGLE LES: CPT

## 2022-03-29 RX ORDER — VALSARTAN 80 MG/1
80 TABLET ORAL
Refills: 0 | Status: DISCONTINUED | COMMUNITY
End: 2022-03-29

## 2022-03-29 NOTE — PHYSICAL EXAM
[FreeTextEntry3] : AAOx3, pleasant, NAD, no visual lymphadenopathy\par hair, scalp, face, nose, eyelids, ears, lips, oropharynx, neck, chest, abdomen, back, right arm, left arm, nails, and hands examined with all normal findings,\par pertinent findings include:\par \par multiple benign nevi and lentigines\par Scaling waxy stuck on papule on right cheek and forehead\par nummular patches on buttocks\par bleeding papule on left inner thigh

## 2022-03-29 NOTE — HISTORY OF PRESENT ILLNESS
[FreeTextEntry1] : spot check [de-identified] : 81 year old male here for various spot checks. itching on buttocks. bleeding growth on left inner thigh.

## 2022-03-29 NOTE — ASSESSMENT
[FreeTextEntry1] : 1) benign findings as above- education\par \par 2) nummular derm\par -education\par -gentle skin care reviewed\par TAC BID PRN; SED\par \par 3) Shave bx location left inner thigh\par diagnosis: r/o IFP\par  \par Shave biopsy performed today over above location, risks and benefits discussed including incomplete removal, not enough tissue for diagnosis scarring and infection, informed consent obtained, pictures taken,  cleaned with alcohol and anesthetized with 1%lido+epi, 0.3 cc total, hemostasis obtained with cautery, vaseline and bandaid placed, tolerated well, wound care reviewed, specimen sent to pathology.\par \par

## 2022-04-12 ENCOUNTER — APPOINTMENT (OUTPATIENT)
Dept: INTERNAL MEDICINE | Facility: CLINIC | Age: 82
End: 2022-04-12

## 2022-04-20 ENCOUNTER — LABORATORY RESULT (OUTPATIENT)
Age: 82
End: 2022-04-20

## 2022-04-21 LAB
25(OH)D3 SERPL-MCNC: 58.5 NG/ML
ALBUMIN SERPL ELPH-MCNC: 4.6 G/DL
ALP BLD-CCNC: 123 U/L
ALT SERPL-CCNC: 18 U/L
ANION GAP SERPL CALC-SCNC: 11 MMOL/L
APPEARANCE: CLEAR
AST SERPL-CCNC: 27 U/L
BACTERIA: NEGATIVE
BASOPHILS # BLD AUTO: 0.05 K/UL
BASOPHILS NFR BLD AUTO: 0.7 %
BILIRUB SERPL-MCNC: 1 MG/DL
BILIRUBIN URINE: NEGATIVE
BLOOD URINE: NEGATIVE
BUN SERPL-MCNC: 18 MG/DL
CALCIUM SERPL-MCNC: 9.4 MG/DL
CHLORIDE SERPL-SCNC: 106 MMOL/L
CHOLEST SERPL-MCNC: 141 MG/DL
CO2 SERPL-SCNC: 28 MMOL/L
COLOR: YELLOW
CREAT SERPL-MCNC: 1.04 MG/DL
EGFR: 73 ML/MIN/1.73M2
EOSINOPHIL # BLD AUTO: 0.3 K/UL
EOSINOPHIL NFR BLD AUTO: 4.4 %
GLUCOSE QUALITATIVE U: NEGATIVE
GLUCOSE SERPL-MCNC: 95 MG/DL
HCT VFR BLD CALC: 46.4 %
HDLC SERPL-MCNC: 61 MG/DL
HGB BLD-MCNC: 14.4 G/DL
HYALINE CASTS: 0 /LPF
IMM GRANULOCYTES NFR BLD AUTO: 0.3 %
KETONES URINE: NEGATIVE
LDLC SERPL CALC-MCNC: 67 MG/DL
LEUKOCYTE ESTERASE URINE: NEGATIVE
LYMPHOCYTES # BLD AUTO: 1.09 K/UL
LYMPHOCYTES NFR BLD AUTO: 16 %
MAN DIFF?: NORMAL
MCHC RBC-ENTMCNC: 29.5 PG
MCHC RBC-ENTMCNC: 31 GM/DL
MCV RBC AUTO: 95.1 FL
MICROSCOPIC-UA: NORMAL
MONOCYTES # BLD AUTO: 0.48 K/UL
MONOCYTES NFR BLD AUTO: 7 %
NEUTROPHILS # BLD AUTO: 4.89 K/UL
NEUTROPHILS NFR BLD AUTO: 71.6 %
NITRITE URINE: NEGATIVE
NONHDLC SERPL-MCNC: 80 MG/DL
PH URINE: 6.5
PLATELET # BLD AUTO: 139 K/UL
POTASSIUM SERPL-SCNC: 3.9 MMOL/L
PROT SERPL-MCNC: 6.6 G/DL
PROTEIN URINE: ABNORMAL
PSA SERPL-MCNC: 1.65 NG/ML
RBC # BLD: 4.88 M/UL
RBC # FLD: 13.9 %
RED BLOOD CELLS URINE: 4 /HPF
SODIUM SERPL-SCNC: 145 MMOL/L
SPECIFIC GRAVITY URINE: 1.03
SQUAMOUS EPITHELIAL CELLS: 1 /HPF
TRIGL SERPL-MCNC: 65 MG/DL
TSH SERPL-ACNC: 5.18 UIU/ML
UROBILINOGEN URINE: NORMAL
WBC # FLD AUTO: 6.83 K/UL
WHITE BLOOD CELLS URINE: 1 /HPF

## 2022-05-04 ENCOUNTER — TRANSCRIPTION ENCOUNTER (OUTPATIENT)
Age: 82
End: 2022-05-04

## 2022-05-04 ENCOUNTER — APPOINTMENT (OUTPATIENT)
Dept: INTERNAL MEDICINE | Facility: CLINIC | Age: 82
End: 2022-05-04
Payer: MEDICARE

## 2022-05-04 DIAGNOSIS — U07.1 COVID-19: ICD-10-CM

## 2022-05-04 PROCEDURE — 99441: CPT | Mod: CS,95

## 2022-05-05 ENCOUNTER — APPOINTMENT (OUTPATIENT)
Dept: INTERNAL MEDICINE | Facility: CLINIC | Age: 82
End: 2022-05-05

## 2022-06-08 ENCOUNTER — APPOINTMENT (OUTPATIENT)
Dept: UROLOGY | Facility: CLINIC | Age: 82
End: 2022-06-08

## 2022-06-16 ENCOUNTER — RX RENEWAL (OUTPATIENT)
Age: 82
End: 2022-06-16

## 2022-06-20 ENCOUNTER — RX RENEWAL (OUTPATIENT)
Age: 82
End: 2022-06-20

## 2022-07-10 ENCOUNTER — RX RENEWAL (OUTPATIENT)
Age: 82
End: 2022-07-10

## 2022-07-19 ENCOUNTER — RX RENEWAL (OUTPATIENT)
Age: 82
End: 2022-07-19

## 2022-07-22 ENCOUNTER — APPOINTMENT (OUTPATIENT)
Dept: INTERNAL MEDICINE | Facility: CLINIC | Age: 82
End: 2022-07-22

## 2022-07-22 VITALS
OXYGEN SATURATION: 97 % | DIASTOLIC BLOOD PRESSURE: 68 MMHG | WEIGHT: 315 LBS | HEIGHT: 69 IN | BODY MASS INDEX: 46.65 KG/M2 | SYSTOLIC BLOOD PRESSURE: 136 MMHG | TEMPERATURE: 98.3 F | RESPIRATION RATE: 18 BRPM | HEART RATE: 52 BPM

## 2022-07-22 DIAGNOSIS — Z00.00 ENCOUNTER FOR GENERAL ADULT MEDICAL EXAMINATION W/OUT ABNORMAL FINDINGS: ICD-10-CM

## 2022-07-22 LAB
INR PPP: 2.2 RATIO
POCT-PROTHROMBIN TIME: 26.2 SECS

## 2022-07-22 PROCEDURE — 85610 PROTHROMBIN TIME: CPT | Mod: QW

## 2022-07-22 PROCEDURE — G0439: CPT

## 2022-07-22 RX ORDER — AZITHROMYCIN 500 MG/1
500 TABLET, FILM COATED ORAL DAILY
Qty: 5 | Refills: 0 | Status: DISCONTINUED | COMMUNITY
Start: 2022-05-05 | End: 2022-07-22

## 2022-07-22 RX ORDER — MULTIVITAMIN/IRON/FOLIC ACID 18MG-0.4MG
TABLET ORAL
Refills: 0 | Status: ACTIVE | COMMUNITY

## 2022-07-22 RX ORDER — OMEGA-3/DHA/EPA/FISH OIL 1200 MG
1200 CAPSULE ORAL
Refills: 0 | Status: ACTIVE | COMMUNITY

## 2022-07-22 RX ORDER — GLUCOSAMINE SULFATE 500 MG
CAPSULE ORAL
Refills: 0 | Status: ACTIVE | COMMUNITY

## 2022-07-22 NOTE — HEALTH RISK ASSESSMENT
[Never] : Never [Yes] : Yes [2 - 3 times a week (3 pts)] : 2 - 3  times a week (3 points) [1 or 2 (0 pts)] : 1 or 2 (0 points) [Never (0 pts)] : Never (0 points) [No] : In the past 12 months have you used drugs other than those required for medical reasons? No [0] : 2) Feeling down, depressed, or hopeless: Not at all (0) [Good] : ~his/her~ current health as good [Very Good] : ~his/her~  mood as very good

## 2022-07-22 NOTE — PLAN
[FreeTextEntry1] : Mr. Quintero presents for an annual physical examination.\par \par #1. We'll continue on current medication regimen.\par \par #2. Patient has been given a prescription for CBC, CMP with hemoglobin A1c, iron level, lipid profile, PSA level, TSH and urinalysis.\par \par #3. Patient is on chronic Coumadin therapy will have INR checked. INR was 2.1. Patient will continue on current Coumadin dosage.\par \par #4. Followup in 6 months.

## 2022-07-22 NOTE — HISTORY OF PRESENT ILLNESS
[FreeTextEntry1] : annual physical examination [de-identified] : Mr. 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 He had an evaluation by nurse practitioner Liseth Og. He was enrolled for a monoclonal antibody evaluation but declined to have treatment.\par Mr. Quintero has a history of obstructive sleep apnea. He is currently on BiPAP at 12/10 cm H2O.\par Patient also has a history of bilateral lower extremity chronic edema. He did have venous ablation of the left leg by Dr. David. There's been no significant improvement in his edema. He wears compression stockings are regular basis.

## 2022-07-22 NOTE — PHYSICAL EXAM
[No Acute Distress] : no acute distress [Well Nourished] : well nourished [Well Developed] : well developed [Well-Appearing] : well-appearing [Normal Sclera/Conjunctiva] : normal sclera/conjunctiva [PERRL] : pupils equal round and reactive to light [EOMI] : extraocular movements intact [Normal Outer Ear/Nose] : the outer ears and nose were normal in appearance [Normal Oropharynx] : the oropharynx was normal [No JVD] : no jugular venous distention [No Lymphadenopathy] : no lymphadenopathy [Supple] : supple [Thyroid Normal, No Nodules] : the thyroid was normal and there were no nodules present [No Respiratory Distress] : no respiratory distress  [No Accessory Muscle Use] : no accessory muscle use [Clear to Auscultation] : lungs were clear to auscultation bilaterally [Normal Rate] : normal rate  [Regular Rhythm] : with a regular rhythm [Normal S1, S2] : normal S1 and S2 [No Murmur] : no murmur heard [No Carotid Bruits] : no carotid bruits [No Abdominal Bruit] : a ~M bruit was not heard ~T in the abdomen [No Varicosities] : no varicosities [Pedal Pulses Present] : the pedal pulses are present [No Palpable Aorta] : no palpable aorta [No Extremity Clubbing/Cyanosis] : no extremity clubbing/cyanosis [Soft] : abdomen soft [Non Tender] : non-tender [Non-distended] : non-distended [No Masses] : no abdominal mass palpated [No HSM] : no HSM [Normal Bowel Sounds] : normal bowel sounds [Normal Posterior Cervical Nodes] : no posterior cervical lymphadenopathy [Normal Anterior Cervical Nodes] : no anterior cervical lymphadenopathy [No CVA Tenderness] : no CVA  tenderness [No Spinal Tenderness] : no spinal tenderness [No Joint Swelling] : no joint swelling [Grossly Normal Strength/Tone] : grossly normal strength/tone [No Rash] : no rash [Coordination Grossly Intact] : coordination grossly intact [No Focal Deficits] : no focal deficits [Normal Gait] : normal gait [Deep Tendon Reflexes (DTR)] : deep tendon reflexes were 2+ and symmetric [Normal Affect] : the affect was normal [Normal Insight/Judgement] : insight and judgment were intact [de-identified] : bilateral lower extremity edema 2-3+

## 2022-11-06 ENCOUNTER — RX RENEWAL (OUTPATIENT)
Age: 82
End: 2022-11-06

## 2022-11-30 ENCOUNTER — NON-APPOINTMENT (OUTPATIENT)
Age: 82
End: 2022-11-30

## 2022-11-30 LAB
INR PPP: 2.9 RATIO
PT BLD: 34 SEC

## 2022-12-16 ENCOUNTER — APPOINTMENT (OUTPATIENT)
Dept: ULTRASOUND IMAGING | Facility: CLINIC | Age: 82
End: 2022-12-16

## 2022-12-16 ENCOUNTER — OUTPATIENT (OUTPATIENT)
Dept: OUTPATIENT SERVICES | Facility: HOSPITAL | Age: 82
LOS: 1 days | End: 2022-12-16
Payer: MEDICARE

## 2022-12-16 DIAGNOSIS — N40.1 BENIGN PROSTATIC HYPERPLASIA WITH LOWER URINARY TRACT SYMPTOMS: ICD-10-CM

## 2022-12-16 DIAGNOSIS — Z87.442 PERSONAL HISTORY OF URINARY CALCULI: ICD-10-CM

## 2022-12-16 DIAGNOSIS — R32 UNSPECIFIED URINARY INCONTINENCE: ICD-10-CM

## 2022-12-16 PROCEDURE — 76770 US EXAM ABDO BACK WALL COMP: CPT

## 2022-12-16 PROCEDURE — 76770 US EXAM ABDO BACK WALL COMP: CPT | Mod: 26

## 2023-01-12 LAB
ALBUMIN SERPL ELPH-MCNC: 4.4 G/DL
ALP BLD-CCNC: 115 U/L
ALT SERPL-CCNC: 15 U/L
ANION GAP SERPL CALC-SCNC: 11 MMOL/L
APPEARANCE: CLEAR
AST SERPL-CCNC: 24 U/L
BACTERIA: NEGATIVE
BASOPHILS # BLD AUTO: 0.06 K/UL
BASOPHILS NFR BLD AUTO: 0.8 %
BILIRUB SERPL-MCNC: 1 MG/DL
BILIRUBIN URINE: NEGATIVE
BLOOD URINE: NEGATIVE
BUN SERPL-MCNC: 16 MG/DL
CALCIUM SERPL-MCNC: 9.1 MG/DL
CHLORIDE SERPL-SCNC: 105 MMOL/L
CHOLEST SERPL-MCNC: 144 MG/DL
CO2 SERPL-SCNC: 29 MMOL/L
COLOR: YELLOW
CREAT SERPL-MCNC: 0.98 MG/DL
EGFR: 77 ML/MIN/1.73M2
EOSINOPHIL # BLD AUTO: 0.29 K/UL
EOSINOPHIL NFR BLD AUTO: 4 %
ESTIMATED AVERAGE GLUCOSE: 111 MG/DL
GLUCOSE QUALITATIVE U: NEGATIVE
GLUCOSE SERPL-MCNC: 90 MG/DL
HBA1C MFR BLD HPLC: 5.5 %
HCT VFR BLD CALC: 44 %
HDLC SERPL-MCNC: 63 MG/DL
HGB BLD-MCNC: 13.6 G/DL
HYALINE CASTS: 0 /LPF
IMM GRANULOCYTES NFR BLD AUTO: 0.4 %
INR PPP: 2.87 RATIO
IRON SERPL-MCNC: 61 UG/DL
KETONES URINE: NEGATIVE
LDLC SERPL CALC-MCNC: 69 MG/DL
LEUKOCYTE ESTERASE URINE: NEGATIVE
LYMPHOCYTES # BLD AUTO: 1.01 K/UL
LYMPHOCYTES NFR BLD AUTO: 14 %
MAN DIFF?: NORMAL
MCHC RBC-ENTMCNC: 29.8 PG
MCHC RBC-ENTMCNC: 30.9 GM/DL
MCV RBC AUTO: 96.5 FL
MICROSCOPIC-UA: NORMAL
MONOCYTES # BLD AUTO: 0.5 K/UL
MONOCYTES NFR BLD AUTO: 6.9 %
NEUTROPHILS # BLD AUTO: 5.32 K/UL
NEUTROPHILS NFR BLD AUTO: 73.9 %
NITRITE URINE: NEGATIVE
NONHDLC SERPL-MCNC: 81 MG/DL
PH URINE: 6.5
PLATELET # BLD AUTO: 141 K/UL
POTASSIUM SERPL-SCNC: 3.6 MMOL/L
PROT SERPL-MCNC: 6.3 G/DL
PROTEIN URINE: ABNORMAL
PSA SERPL-MCNC: 2.68 NG/ML
PT BLD: 34.2 SEC
RBC # BLD: 4.56 M/UL
RBC # FLD: 13.9 %
RED BLOOD CELLS URINE: 3 /HPF
SODIUM SERPL-SCNC: 145 MMOL/L
SPECIFIC GRAVITY URINE: 1.02
SQUAMOUS EPITHELIAL CELLS: 0 /HPF
TRIGL SERPL-MCNC: 59 MG/DL
TSH SERPL-ACNC: 4.93 UIU/ML
UROBILINOGEN URINE: ABNORMAL
WBC # FLD AUTO: 7.21 K/UL
WHITE BLOOD CELLS URINE: 2 /HPF

## 2023-01-13 ENCOUNTER — NON-APPOINTMENT (OUTPATIENT)
Age: 83
End: 2023-01-13

## 2023-01-16 ENCOUNTER — RX RENEWAL (OUTPATIENT)
Age: 83
End: 2023-01-16

## 2023-01-17 ENCOUNTER — RX RENEWAL (OUTPATIENT)
Age: 83
End: 2023-01-17

## 2023-01-18 ENCOUNTER — RX RENEWAL (OUTPATIENT)
Age: 83
End: 2023-01-18

## 2023-01-23 ENCOUNTER — RX RENEWAL (OUTPATIENT)
Age: 83
End: 2023-01-23

## 2023-01-24 ENCOUNTER — APPOINTMENT (OUTPATIENT)
Dept: INTERNAL MEDICINE | Facility: CLINIC | Age: 83
End: 2023-01-24
Payer: MEDICARE

## 2023-01-24 ENCOUNTER — NON-APPOINTMENT (OUTPATIENT)
Age: 83
End: 2023-01-24

## 2023-01-24 VITALS
HEIGHT: 69 IN | SYSTOLIC BLOOD PRESSURE: 140 MMHG | WEIGHT: 315 LBS | OXYGEN SATURATION: 95 % | DIASTOLIC BLOOD PRESSURE: 60 MMHG | HEART RATE: 50 BPM | BODY MASS INDEX: 46.65 KG/M2 | TEMPERATURE: 98 F

## 2023-01-24 DIAGNOSIS — I87.1 COMPRESSION OF VEIN: ICD-10-CM

## 2023-01-24 DIAGNOSIS — J98.4 OTHER DISORDERS OF LUNG: ICD-10-CM

## 2023-01-24 PROCEDURE — 94060 EVALUATION OF WHEEZING: CPT

## 2023-01-24 PROCEDURE — 99215 OFFICE O/P EST HI 40 MIN: CPT | Mod: 25

## 2023-01-24 RX ORDER — ALFUZOSIN HYDROCHLORIDE 10 MG/1
10 TABLET, EXTENDED RELEASE ORAL
Qty: 90 | Refills: 0 | Status: DISCONTINUED | COMMUNITY
Start: 2017-11-06 | End: 2023-01-24

## 2023-01-24 NOTE — PLAN
[FreeTextEntry1] : Mr. Quintero presents for a follow-up evaluation.\par \par 1.  He will continue on current medication regimen which has been reviewed and revised.\par \par 2.  Patient's asthma is well controlled.  He will continue on Singulair 10 mg daily, Xopenex for rescue therapy.\par \par 3.  Patient will continue on BiPAP for treatment of obstructive sleep apnea #4 patient will continue on anticoagulation for treatment of atrial fibrillation.

## 2023-01-24 NOTE — DATA REVIEWED
[FreeTextEntry1] : Spirometry pre and postbronchodilator therapy was performed.\par FEV1 is 2.29 L which is 85% predicted.\par FVC is 3.46 L which is 91% predicted.\par FEV1/FVC ratio is 66%.\par FEF 25/75% is 1.27 L/s which is 69% predicted.\par PEF is 5.30 L/s which is 77% predicted.

## 2023-01-24 NOTE — HISTORY OF PRESENT ILLNESS
[FreeTextEntry1] : Follow-up evaluation [de-identified] : Mr. Quintero presents for follow-up evaluation.\par He denies any chest pain, shortness of breath or palpitations.\par He continues to have his PT/INR checked on a regular basis.\par Patient also presents for review of comprehensive blood profile.

## 2023-02-10 ENCOUNTER — APPOINTMENT (OUTPATIENT)
Dept: UROLOGY | Facility: CLINIC | Age: 83
End: 2023-02-10
Payer: MEDICARE

## 2023-02-10 VITALS
OXYGEN SATURATION: 97 % | TEMPERATURE: 97.5 F | DIASTOLIC BLOOD PRESSURE: 74 MMHG | HEART RATE: 80 BPM | WEIGHT: 315 LBS | BODY MASS INDEX: 46.65 KG/M2 | RESPIRATION RATE: 16 BRPM | SYSTOLIC BLOOD PRESSURE: 169 MMHG | HEIGHT: 69 IN

## 2023-02-10 PROCEDURE — 99214 OFFICE O/P EST MOD 30 MIN: CPT

## 2023-02-24 NOTE — HISTORY OF PRESENT ILLNESS
[FreeTextEntry1] : 82 year old male presents for Elevated PSA\par Recently had PSA checked and was told is elevated. \par Denies any recent unintentional weight loss, night sweats and new bone or back pain.\par Family history of Prostate cancer: Brother. \par Lost 80 lbs last year and gained 30 back.  \par Same urination. \par \par Seen on 12/22/22 for history of Benign Prostatic Hyperplasia. \par Saw Dr Garcia and Rosita in the past. \par On Alfuzosin and Avodart. \par On Lasix, takes at 1.30p, urinates frequently requiring pads, changes 3 pads. Starts around 4p to 9p. No nocturia. \par Urinates few times before Lasix. Sometimes stands and sometimes sits to urinate. \par Had variable stream, occasional sense of incomplete emptying.\par Denied hesitancy, straining.\par Denied dysuria, hematuria, lower abdominal or flank pain, fever, chills or rigors.\par Not sexually active. Has Erectile dysfunction. \par Family history of Prostate cancer: Brother. \par History of kidney stone. \par

## 2023-02-24 NOTE — ASSESSMENT
[FreeTextEntry1] : Reviewed records.\par Discussed labs and imaging. \par \par Renal cyst:\par Discussed that Renal cysts are a common finding on routine radiological studies. Autopsy studies in patients over the age of 50 reveal greater than a 50% chance of having at least one simple renal cyst.\par And that renal cysts may be classified as simple or complex depending how they look on imaging. Discussed complexity of renal cysts and its implications as regards malignancy. \par  \par Benign Prostatic Hyperplasia:\par Discussed treatment options, will continue Alfuzosin and Avodart.\par \par Elevated PSA:\par Discussed considering Patient on Finasteride, corrected PSA: 5.36. \par Discussed with the patient that PSA is a substance produced by the prostate gland. Elevated PSA levels may indicate a noncancerous condition such as prostatitis, or an enlarged prostate but it may also indicate prostate cancer.\par Discussed PSA screening and latest recommendations/guidelines- USPTF and AUA. \par Explained that only way to rule out Prostate cancer in a patient with elevated PSA, increased PSA velocity or abnormal digital rectal exam is to do Prostate needle biopsy.\par Total and Free PSA 1 week before next appointment.\par \par Return to office in 3 months or sooner if any issues.

## 2023-02-24 NOTE — PHYSICAL EXAM
[Urethral Meatus] : meatus normal [Penis Abnormality] : normal circumcised penis [Scrotum] : the scrotum was normal [Epididymis] : the epididymides were normal [Testes Tenderness] : no tenderness of the testes [Testes Mass (___cm)] : there were no testicular masses [Normal Appearance] : normal appearance [General Appearance - In No Acute Distress] : no acute distress [FreeTextEntry1] : normal peripheral circulation  [] : no respiratory distress [Oriented To Time, Place, And Person] : oriented to person, place, and time

## 2023-04-06 ENCOUNTER — NON-APPOINTMENT (OUTPATIENT)
Age: 83
End: 2023-04-06

## 2023-04-06 LAB
INR PPP: 2.85 RATIO
PT BLD: 34 SEC

## 2023-04-24 ENCOUNTER — LABORATORY RESULT (OUTPATIENT)
Age: 83
End: 2023-04-24

## 2023-04-27 ENCOUNTER — RX RENEWAL (OUTPATIENT)
Age: 83
End: 2023-04-27

## 2023-05-10 ENCOUNTER — APPOINTMENT (OUTPATIENT)
Dept: UROLOGY | Facility: CLINIC | Age: 83
End: 2023-05-10

## 2023-05-26 ENCOUNTER — NON-APPOINTMENT (OUTPATIENT)
Age: 83
End: 2023-05-26

## 2023-05-26 LAB
INR PPP: 4.2 RATIO
PT BLD: 49.8 SEC

## 2023-06-02 ENCOUNTER — NON-APPOINTMENT (OUTPATIENT)
Age: 83
End: 2023-06-02

## 2023-06-02 ENCOUNTER — APPOINTMENT (OUTPATIENT)
Dept: INTERNAL MEDICINE | Facility: CLINIC | Age: 83
End: 2023-06-02
Payer: MEDICARE

## 2023-06-02 LAB
INR PPP: 2.5 RATIO
POCT-PROTHROMBIN TIME: 29.6 SECS

## 2023-06-02 PROCEDURE — 85610 PROTHROMBIN TIME: CPT | Mod: QW

## 2023-06-09 ENCOUNTER — NON-APPOINTMENT (OUTPATIENT)
Age: 83
End: 2023-06-09

## 2023-06-09 ENCOUNTER — APPOINTMENT (OUTPATIENT)
Dept: INTERNAL MEDICINE | Facility: CLINIC | Age: 83
End: 2023-06-09
Payer: MEDICARE

## 2023-06-09 ENCOUNTER — RESULT CHARGE (OUTPATIENT)
Age: 83
End: 2023-06-09

## 2023-06-09 LAB
INR PPP: 3 RATIO
POCT-PROTHROMBIN TIME: 36.5 SECS

## 2023-06-09 PROCEDURE — 85610 PROTHROMBIN TIME: CPT | Mod: QW

## 2023-06-09 RX ORDER — METOPROLOL SUCCINATE 25 MG/1
25 TABLET, EXTENDED RELEASE ORAL TWICE DAILY
Qty: 60 | Refills: 0 | Status: ACTIVE | COMMUNITY
Start: 2023-06-09 | End: 1900-01-01

## 2023-06-09 RX ORDER — DILTIAZEM HYDROCHLORIDE 360 MG/1
360 CAPSULE, COATED, EXTENDED RELEASE ORAL
Refills: 0 | Status: DISCONTINUED | COMMUNITY
End: 2023-06-09

## 2023-06-15 ENCOUNTER — RESULT CHARGE (OUTPATIENT)
Age: 83
End: 2023-06-15

## 2023-06-16 ENCOUNTER — NON-APPOINTMENT (OUTPATIENT)
Age: 83
End: 2023-06-16

## 2023-06-16 ENCOUNTER — APPOINTMENT (OUTPATIENT)
Dept: INTERNAL MEDICINE | Facility: CLINIC | Age: 83
End: 2023-06-16
Payer: MEDICARE

## 2023-06-16 LAB
INR PPP: 3.1 RATIO
POCT-PROTHROMBIN TIME: 37.6 SECS

## 2023-06-16 PROCEDURE — 85610 PROTHROMBIN TIME: CPT | Mod: QW

## 2023-06-22 ENCOUNTER — RESULT CHARGE (OUTPATIENT)
Age: 83
End: 2023-06-22

## 2023-06-23 ENCOUNTER — NON-APPOINTMENT (OUTPATIENT)
Age: 83
End: 2023-06-23

## 2023-06-23 ENCOUNTER — APPOINTMENT (OUTPATIENT)
Dept: INTERNAL MEDICINE | Facility: CLINIC | Age: 83
End: 2023-06-23
Payer: MEDICARE

## 2023-06-23 LAB
INR PPP: 2.6 RATIO
POCT-PROTHROMBIN TIME: 30.9 SECS

## 2023-06-23 PROCEDURE — 85610 PROTHROMBIN TIME: CPT | Mod: QW

## 2023-06-30 ENCOUNTER — NON-APPOINTMENT (OUTPATIENT)
Age: 83
End: 2023-06-30

## 2023-06-30 ENCOUNTER — APPOINTMENT (OUTPATIENT)
Dept: INTERNAL MEDICINE | Facility: CLINIC | Age: 83
End: 2023-06-30
Payer: MEDICARE

## 2023-06-30 ENCOUNTER — RESULT CHARGE (OUTPATIENT)
Age: 83
End: 2023-06-30

## 2023-06-30 LAB
INR PPP: 2.8 RATIO
POCT-PROTHROMBIN TIME: 34.1 SECS
QUALITY CONTROL: YES

## 2023-06-30 PROCEDURE — 85610 PROTHROMBIN TIME: CPT | Mod: QW

## 2023-07-07 LAB
ALBUMIN SERPL ELPH-MCNC: 4.4 G/DL
ALP BLD-CCNC: 90 U/L
ALT SERPL-CCNC: 19 U/L
ANION GAP SERPL CALC-SCNC: 11 MMOL/L
AST SERPL-CCNC: 26 U/L
BILIRUB SERPL-MCNC: 1 MG/DL
BUN SERPL-MCNC: 19 MG/DL
CALCIUM SERPL-MCNC: 9.6 MG/DL
CHLORIDE SERPL-SCNC: 106 MMOL/L
CHOLEST SERPL-MCNC: 129 MG/DL
CO2 SERPL-SCNC: 28 MMOL/L
CREAT SERPL-MCNC: 1.03 MG/DL
EGFR: 73 ML/MIN/1.73M2
GLUCOSE SERPL-MCNC: 89 MG/DL
HDLC SERPL-MCNC: 57 MG/DL
LDLC SERPL CALC-MCNC: 60 MG/DL
NONHDLC SERPL-MCNC: 73 MG/DL
POTASSIUM SERPL-SCNC: 4.3 MMOL/L
PROT SERPL-MCNC: 6.3 G/DL
SODIUM SERPL-SCNC: 144 MMOL/L
TRIGL SERPL-MCNC: 64 MG/DL

## 2023-07-11 ENCOUNTER — APPOINTMENT (OUTPATIENT)
Dept: INTERNAL MEDICINE | Facility: CLINIC | Age: 83
End: 2023-07-11
Payer: MEDICARE

## 2023-07-11 VITALS
TEMPERATURE: 97.7 F | OXYGEN SATURATION: 96 % | HEIGHT: 69 IN | DIASTOLIC BLOOD PRESSURE: 50 MMHG | HEART RATE: 49 BPM | RESPIRATION RATE: 16 BRPM | WEIGHT: 300 LBS | BODY MASS INDEX: 44.43 KG/M2 | SYSTOLIC BLOOD PRESSURE: 130 MMHG

## 2023-07-11 DIAGNOSIS — I48.91 UNSPECIFIED ATRIAL FIBRILLATION: ICD-10-CM

## 2023-07-11 DIAGNOSIS — R05.8 OTHER SPECIFIED COUGH: ICD-10-CM

## 2023-07-11 DIAGNOSIS — K21.9 GASTRO-ESOPHAGEAL REFLUX DISEASE W/OUT ESOPHAGITIS: ICD-10-CM

## 2023-07-11 DIAGNOSIS — I10 ESSENTIAL (PRIMARY) HYPERTENSION: ICD-10-CM

## 2023-07-11 DIAGNOSIS — Z87.898 PERSONAL HISTORY OF OTHER SPECIFIED CONDITIONS: ICD-10-CM

## 2023-07-11 DIAGNOSIS — G47.33 OBSTRUCTIVE SLEEP APNEA (ADULT) (PEDIATRIC): ICD-10-CM

## 2023-07-11 PROCEDURE — 99214 OFFICE O/P EST MOD 30 MIN: CPT

## 2023-07-11 NOTE — PLAN
[FreeTextEntry1] : Mr. Quintero presents for follow-up evaluation.\par \par 1.  He will continue on current medication regimen which has been reviewed and revised.\par \par 2.  Patient is on chronic Coumadin therapy.  He will continue to have his INR adjusted as indicated.\par \par 3.  Mr. Quintero will continue on CPAP therapy for treatment of his obstructive sleep apnea.\par \par 4.  Complete metabolic profile and lipid profile were reviewed with patient.\par \par 5.  He will follow-up in 6 months with CBC, CMP, hemoglobin A1c, iron level, lipid profile, PSA level, TSH and urinalysis.\par \par 6.  Urology follow-up with .

## 2023-07-11 NOTE — HISTORY OF PRESENT ILLNESS
[FreeTextEntry1] : Follow-up evaluation [de-identified] : Mr. Quintero presents for a follow-up evaluation.  He has a history of morbid obesity, bilateral pulmonary emboli, DVT, IVC filter, hypertension, hypercholesterolemia, obstructive sleep apnea on CPAP, atrial fibrillation.  Mr. Quintero is complaining of bilateral knee pain.  He remains morbidly obese.  He continues to use his CPAP on a regular basis.  He has no nocturnal symptoms of cough or shortness of breath.  Mr. Quintero continues to follow-up with urology due to a history of microscopic hematuria and elevated PSA

## 2023-07-14 ENCOUNTER — NON-APPOINTMENT (OUTPATIENT)
Age: 83
End: 2023-07-14

## 2023-07-14 LAB
INR PPP: 2.3 RATIO
PT BLD: 27.1 SEC

## 2023-08-01 LAB
INR PPP: 2.62 RATIO
PT BLD: 28.8 SEC

## 2023-08-02 ENCOUNTER — RX RENEWAL (OUTPATIENT)
Age: 83
End: 2023-08-02

## 2023-08-14 LAB
INR PPP: 2.1 RATIO
PT BLD: 23.2 SEC

## 2023-09-11 ENCOUNTER — RX RENEWAL (OUTPATIENT)
Age: 83
End: 2023-09-11

## 2023-09-27 ENCOUNTER — NON-APPOINTMENT (OUTPATIENT)
Age: 83
End: 2023-09-27

## 2023-09-27 LAB
INR PPP: 2.74 RATIO
PT BLD: 30.1 SEC

## 2023-10-02 NOTE — H&P ADULT - HISTORY OF PRESENT ILLNESS
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 Stress test revealing mid inferoseptal ischemia. 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 Cardizem dose decreased due to bradycardia. Stress test revealing mid inferoseptal ischemia. Referred for cardiac cath with possible intervention.     Of note) Pt has been bradycardic at [id number] on tele while in holding prior to cath, Pt is asymptomatic. 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 Cardizem dose decreased due to bradycardia. Stress test revealing mid inferoseptal ischemia. Referred for cardiac cath with possible intervention.     Of note) Pt has been bradycardic at [id number] on tele while in holding prior to cath, Pt is asymptomatic, BP remains stable.  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 Cardizem dose decreased due to bradycardia. Pt underwent to Stress test, revealing mid inferoseptal ischemia. Referred for cardiac cath with possible intervention.     Of note) Pt has been bradycardic at [id number] on tele while in holding prior to cath, Pt is asymptomatic, BP remains stable. INR: 1.3 on I-stat today

## 2023-10-02 NOTE — H&P ADULT - NSHPLABSRESULTS_GEN_ALL_CORE
9/2023 LINQ reporting VTACH  9/2023  Stress test revealing mid inferoseptal ischemia.    10/3/23 EKG 9/2023 LINQ reporting NSVT 1.7%, Afib/AT 17.8% burden   9/2023  Stress test revealing mid inferoseptal ischemia.  10/3/23 EKG : Aifb at 38 bpm, RBBB

## 2023-10-02 NOTE — H&P ADULT - NSICDXPASTMEDICALHX_GEN_ALL_CORE_FT
PAST MEDICAL HISTORY:  Afib     HLD (hyperlipidemia)     HTN (hypertension)     May-Thurner syndrome     Sleep apnea     Venous insufficiency

## 2023-10-02 NOTE — H&P ADULT - ASSESSMENT
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 ANABEL reporting VTACH last month. Stress test revealing mid inferoseptal ischemia. Referred for cardiac cath     ASA class:  Creatinine:  GFR:  Bleeding  Risk score:  Enrrique Score:  81 y/o M with PMHx  of HTN, HLD, AFIB on coumadin (last dose:9/30/23), May thurner syndrome, RACHEL on bipap presented to cardiology for follow up with c/o BURKS, revealed bradycardia at mis 30s-40s on EKG, placed LINQ reporting NSVT 1.7%, AFbi/AT 17.8% burden. Cardizem dose decreased due to bradycardia. Pt underwent to Stress test, revealing mid inferoseptal ischemia. Referred for cardiac cath with possible intervention      ASA class: III   Creatinine: 0.99  GFR: 77  Bleeding  Risk score: 1.0%   Enrrique Score: 5 points

## 2023-10-02 NOTE — H&P ADULT - NSHPPHYSICALEXAM_GEN_ALL_CORE
Vital Signs Last 24 Hrs  T(C): 36.6 (03 Oct 2023 12:00), Max: 36.6 (03 Oct 2023 12:00)  T(F): 97.8 (03 Oct 2023 12:00), Max: 97.8 (03 Oct 2023 12:00)  HR: 41 (03 Oct 2023 12:00) (41 - 41)  BP: 148/73 (03 Oct 2023 12:00) (148/73 - 148/73)  RR: 16 (03 Oct 2023 12:00) (16 - 16)  SpO2: 99% (03 Oct 2023 12:00) (99% - 99%)    Parameters below as of 03 Oct 2023 12:00  Patient On (Oxygen Delivery Method): room air        Constitutional: well developed, well nourished, no deformities and no acute distress  Neurological: Alert & Oriented x 3, JACKSON, no focal deficits  HEENT: NC/AT, PERRLA, EOMI,  Neck supple.  Respiratory: + fine rales, no wheezing  Cardiovascular: (+) S1 & S2, RRR, No murmur/ rub/ gallop   Gastrointestinal: soft, Nontender, nondistended, (+) BS  Genitourinary: non distended bladder, voiding freely  Extremities: + diffused B/L LE edema,+ doppler pulses   Skin:  B/L swelling, brownish skin discoloration

## 2023-10-02 NOTE — H&P ADULT - NSHPREVIEWOFSYSTEMS_GEN_ALL_CORE
General: Pt denies recent weight loss/fever/chills  Neurological: denies numbness or  sensation loss  Cardiovascular: + fluttering sensation denies chest pain/palpitations/leg edema  Respiratory and Thorax: + SOB, denies cough/wheezing  Gastrointestinal: denies abdominal pain/diarrhea/ nausea/ vomiting/ constipation/bloody stool  Genitourinary: denies urinary frequency/urgency/ dysuria/ hematuria   Musculoskeletal: denies joint pain or swelling/ restricted range of motion  Hematologic: denies abnormal bleeding

## 2023-10-02 NOTE — H&P ADULT - NSICDXFAMILYHX_GEN_ALL_CORE_FT
FAMILY HISTORY:  Father  Still living? Unknown  Family history of diabetes mellitus (DM), Age at diagnosis: Age Unknown    Uncle  Still living? Unknown  Family history of diabetes mellitus (DM), Age at diagnosis: Age Unknown

## 2023-10-03 ENCOUNTER — OUTPATIENT (OUTPATIENT)
Dept: OUTPATIENT SERVICES | Facility: HOSPITAL | Age: 83
LOS: 1 days | Discharge: HOME | End: 2023-10-03
Payer: MEDICARE

## 2023-10-03 VITALS
HEART RATE: 42 BPM | RESPIRATION RATE: 20 BRPM | SYSTOLIC BLOOD PRESSURE: 118 MMHG | OXYGEN SATURATION: 99 % | DIASTOLIC BLOOD PRESSURE: 66 MMHG

## 2023-10-03 VITALS
DIASTOLIC BLOOD PRESSURE: 73 MMHG | HEART RATE: 41 BPM | RESPIRATION RATE: 16 BRPM | TEMPERATURE: 98 F | OXYGEN SATURATION: 99 % | SYSTOLIC BLOOD PRESSURE: 148 MMHG

## 2023-10-03 DIAGNOSIS — R06.09 OTHER FORMS OF DYSPNEA: ICD-10-CM

## 2023-10-03 DIAGNOSIS — Z98.890 OTHER SPECIFIED POSTPROCEDURAL STATES: Chronic | ICD-10-CM

## 2023-10-03 PROCEDURE — 93010 ELECTROCARDIOGRAM REPORT: CPT

## 2023-10-03 PROCEDURE — 93005 ELECTROCARDIOGRAM TRACING: CPT | Mod: XU

## 2023-10-03 PROCEDURE — C1894: CPT

## 2023-10-03 PROCEDURE — 93458 L HRT ARTERY/VENTRICLE ANGIO: CPT

## 2023-10-03 PROCEDURE — C1887: CPT

## 2023-10-03 PROCEDURE — C1769: CPT

## 2023-10-03 NOTE — CHART NOTE - NSCHARTNOTEFT_GEN_A_CORE
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 Cardizem dose decreased due to bradycardia. Pt underwent to Stress test, revealing mid inferoseptal ischemia. Referred for cardiac cath with possible intervention.     Of note) Pt has been bradycardic at whe52s-78q on tele while in holding prior to cath, Pt is asymptomatic, BP remains stable. INR: 1.3 on I-stat today   (02 Oct 2023 18:30)    T(C): 36.6 (10-03-23 @ 12:00), Max: 36.6 (10-03-23 @ 12:00)  HR: 42 (10-03-23 @ 15:00) (41 - 43)  BP: 154/72 (10-03-23 @ 15:00) (147/73 - 154/72)  RR: 18 (10-03-23 @ 15:00) (16 - 18)  SpO2: 99% (10-03-23 @ 15:00) (99% - 99%)  Wt(kg): --    PHYSICAL EXAM:  Neurologic: Non-focal, AxOx3.  No neuro deficits  Vascular: Peripheral pulses palpable 2+ bilaterally  Procedure Site: RT. 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 Cardizem dose decreased due to bradycardia. Pt underwent to Stress test, revealing mid inferoseptal ischemia. S/P C       -VS, labs, diet, activity as per post cath orders  -IV hydration  -Encourage PO fluids  -Sedation instructions reviewed with patient   -Continue current medications  -Pt. to be discharged home today  -Plan of care D/W pt. and MD  -Post cath instructions reviewed with pt., pt. verbalizes and understands instructions  -Follow-up with attending/cardiologist

## 2023-10-03 NOTE — PACU DISCHARGE NOTE - COMMENTS
pt is s/p LHC via RRA. pt is aaox4, denies pain, discomfort, palpitations, SOB, dizziness. RUE is warm and mobile w no s/sx bleeding or hematoma. discharge instructions reviewed w pt including medication reconciliation, follow up appts and post catheterization precautions. pt verbalizes understanding of information and provides teachback. all questions answered to pt satisfaction. IVL removed 20# from L AC. pt is pending transport to [facility] to care of wife Tiny.

## 2023-10-04 NOTE — POST DISCHARGE NOTE - DETAILS:
Post procedure phone call completed; patient understood all discharge paperwork. No questions regarding medications or pain management. MD follow up appointment made. Patient was able to rest when they were discharged. Patient will recommend [facility], no complaints of hospital stay, satisfied with care. Instructed patient to contact provider with any further questions or concerns.

## 2023-10-11 DIAGNOSIS — I25.10 ATHEROSCLEROTIC HEART DISEASE OF NATIVE CORONARY ARTERY WITHOUT ANGINA PECTORIS: ICD-10-CM

## 2023-10-13 DIAGNOSIS — F41.8 OTHER SPECIFIED ANXIETY DISORDERS: ICD-10-CM

## 2023-10-13 RX ORDER — DIAZEPAM 5 MG/1
5 TABLET ORAL ONCE
Qty: 2 | Refills: 0 | Status: ACTIVE | COMMUNITY
Start: 2023-10-13 | End: 1900-01-01

## 2023-11-09 ENCOUNTER — RX RENEWAL (OUTPATIENT)
Age: 83
End: 2023-11-09

## 2023-11-29 PROBLEM — E78.5 HYPERLIPIDEMIA, UNSPECIFIED: Chronic | Status: ACTIVE | Noted: 2023-10-03

## 2023-11-29 PROBLEM — I10 ESSENTIAL (PRIMARY) HYPERTENSION: Chronic | Status: ACTIVE | Noted: 2023-10-03

## 2023-11-29 PROBLEM — I87.1 COMPRESSION OF VEIN: Chronic | Status: ACTIVE | Noted: 2023-10-03

## 2023-11-29 PROBLEM — I87.2 VENOUS INSUFFICIENCY (CHRONIC) (PERIPHERAL): Chronic | Status: ACTIVE | Noted: 2023-10-03

## 2024-01-02 LAB
ALBUMIN SERPL ELPH-MCNC: 4.5 G/DL
ALP BLD-CCNC: 89 U/L
ALT SERPL-CCNC: 21 U/L
ANION GAP SERPL CALC-SCNC: 12 MMOL/L
APPEARANCE: CLEAR
AST SERPL-CCNC: 22 U/L
BACTERIA: NEGATIVE /HPF
BILIRUB SERPL-MCNC: 0.8 MG/DL
BILIRUBIN URINE: NEGATIVE
BLOOD URINE: NEGATIVE
BUN SERPL-MCNC: 22 MG/DL
CALCIUM SERPL-MCNC: 9.3 MG/DL
CAST: 0 /LPF
CHLORIDE SERPL-SCNC: 105 MMOL/L
CHOLEST SERPL-MCNC: 137 MG/DL
CO2 SERPL-SCNC: 24 MMOL/L
COLOR: YELLOW
CREAT SERPL-MCNC: 1.04 MG/DL
EGFR: 72 ML/MIN/1.73M2
EPITHELIAL CELLS: 0 /HPF
ESTIMATED AVERAGE GLUCOSE: 114 MG/DL
GLUCOSE QUALITATIVE U: NEGATIVE MG/DL
GLUCOSE SERPL-MCNC: 87 MG/DL
HBA1C MFR BLD HPLC: 5.6 %
HDLC SERPL-MCNC: 61 MG/DL
INR PPP: 3.14 RATIO
IRON SERPL-MCNC: 59 UG/DL
KETONES URINE: NEGATIVE MG/DL
LDLC SERPL CALC-MCNC: 64 MG/DL
LEUKOCYTE ESTERASE URINE: NEGATIVE
MICROSCOPIC-UA: NORMAL
NITRITE URINE: NEGATIVE
NONHDLC SERPL-MCNC: 75 MG/DL
PH URINE: 6.5
POTASSIUM SERPL-SCNC: 4.3 MMOL/L
PROT SERPL-MCNC: 5.9 G/DL
PROTEIN URINE: NEGATIVE MG/DL
PSA SERPL-MCNC: 2.03 NG/ML
PT BLD: 34.6 SEC
RED BLOOD CELLS URINE: 1 /HPF
SODIUM SERPL-SCNC: 142 MMOL/L
SPECIFIC GRAVITY URINE: 1.02
TRIGL SERPL-MCNC: 49 MG/DL
TSH SERPL-ACNC: 3.49 UIU/ML
UROBILINOGEN URINE: 0.2 MG/DL
WHITE BLOOD CELLS URINE: 0 /HPF

## 2024-01-04 ENCOUNTER — NON-APPOINTMENT (OUTPATIENT)
Age: 84
End: 2024-01-04

## 2024-01-04 ENCOUNTER — APPOINTMENT (OUTPATIENT)
Dept: INTERNAL MEDICINE | Facility: CLINIC | Age: 84
End: 2024-01-04
Payer: MEDICARE

## 2024-01-04 DIAGNOSIS — E55.9 VITAMIN D DEFICIENCY, UNSPECIFIED: ICD-10-CM

## 2024-01-04 LAB
INR PPP: 2.8 RATIO
POCT-PROTHROMBIN TIME: 33.6 SECS

## 2024-01-04 PROCEDURE — 85610 PROTHROMBIN TIME: CPT | Mod: QW

## 2024-01-10 ENCOUNTER — APPOINTMENT (OUTPATIENT)
Dept: INTERNAL MEDICINE | Facility: CLINIC | Age: 84
End: 2024-01-10
Payer: MEDICARE

## 2024-01-10 VITALS
HEART RATE: 65 BPM | DIASTOLIC BLOOD PRESSURE: 72 MMHG | WEIGHT: 302 LBS | HEIGHT: 69 IN | TEMPERATURE: 97.3 F | RESPIRATION RATE: 16 BRPM | SYSTOLIC BLOOD PRESSURE: 154 MMHG | OXYGEN SATURATION: 97 % | BODY MASS INDEX: 44.73 KG/M2

## 2024-01-10 DIAGNOSIS — Z95.828 PRESENCE OF OTHER VASCULAR IMPLANTS AND GRAFTS: ICD-10-CM

## 2024-01-10 DIAGNOSIS — F17.200 NICOTINE DEPENDENCE, UNSPECIFIED, UNCOMPLICATED: ICD-10-CM

## 2024-01-10 DIAGNOSIS — I26.99 OTHER PULMONARY EMBOLISM W/OUT ACUTE COR PULMONALE: ICD-10-CM

## 2024-01-10 DIAGNOSIS — E78.00 PURE HYPERCHOLESTEROLEMIA, UNSPECIFIED: ICD-10-CM

## 2024-01-10 DIAGNOSIS — E66.01 MORBID (SEVERE) OBESITY DUE TO EXCESS CALORIES: ICD-10-CM

## 2024-01-10 PROCEDURE — 99214 OFFICE O/P EST MOD 30 MIN: CPT

## 2024-01-10 RX ORDER — WARFARIN 5 MG/1
5 TABLET ORAL
Refills: 0 | Status: ACTIVE | COMMUNITY

## 2024-01-10 NOTE — REVIEW OF SYSTEMS
[Fatigue] : fatigue [Lower Ext Edema] : lower extremity edema [Dyspnea on Exertion] : dyspnea on exertion [Negative] : Heme/Lymph

## 2024-01-10 NOTE — PLAN
[FreeTextEntry1] : Mr. Quintero presents for follow-up evaluation.  1.  Patient will continue on current medication regimen which has been reviewed and revised.  Last INR was 2.8. 2.  Patient will follow-up with cardiology and electrophysiology to discuss getting pacemaker/AICD. 3.  Continue on BiPAP for his obstructive sleep apnea.  Polysomnography examination on 10/7/2009 showed moderate obstructive sleep apnea with an AHI of 19.2 events per hour with significant oxygen desaturation.  Patient had a subsequent BiPAP study on 11/17/2009 which showed optimal treatment with BiPAP at 10/7 cm pressure. 4.  Repeat in 6 months.  Patient has been given prescription for comprehensive lab work. 5.  Orthopedic follow-up to continue with Dr. Irizarry

## 2024-01-10 NOTE — PHYSICAL EXAM
[No Acute Distress] : no acute distress [Well Nourished] : well nourished [Well Developed] : well developed [Well-Appearing] : well-appearing [Normal Sclera/Conjunctiva] : normal sclera/conjunctiva [PERRL] : pupils equal round and reactive to light [EOMI] : extraocular movements intact [Normal Outer Ear/Nose] : the outer ears and nose were normal in appearance [Normal Oropharynx] : the oropharynx was normal [No JVD] : no jugular venous distention [No Lymphadenopathy] : no lymphadenopathy [Supple] : supple [Thyroid Normal, No Nodules] : the thyroid was normal and there were no nodules present [No Respiratory Distress] : no respiratory distress  [No Accessory Muscle Use] : no accessory muscle use [Clear to Auscultation] : lungs were clear to auscultation bilaterally [Regular Rhythm] : with a regular rhythm [Normal S1, S2] : normal S1 and S2 [No Carotid Bruits] : no carotid bruits [No Abdominal Bruit] : a ~M bruit was not heard ~T in the abdomen [No Varicosities] : no varicosities [Pedal Pulses Present] : the pedal pulses are present [No Edema] : there was no peripheral edema [No Palpable Aorta] : no palpable aorta [No Extremity Clubbing/Cyanosis] : no extremity clubbing/cyanosis [Soft] : abdomen soft [Non Tender] : non-tender [Non-distended] : non-distended [No Masses] : no abdominal mass palpated [No HSM] : no HSM [Normal Bowel Sounds] : normal bowel sounds [Normal Posterior Cervical Nodes] : no posterior cervical lymphadenopathy [Normal Anterior Cervical Nodes] : no anterior cervical lymphadenopathy [No CVA Tenderness] : no CVA  tenderness [No Spinal Tenderness] : no spinal tenderness [No Joint Swelling] : no joint swelling [Grossly Normal Strength/Tone] : grossly normal strength/tone [No Rash] : no rash [Coordination Grossly Intact] : coordination grossly intact [No Focal Deficits] : no focal deficits [Normal Gait] : normal gait [Deep Tendon Reflexes (DTR)] : deep tendon reflexes were 2+ and symmetric [Normal Affect] : the affect was normal [Normal Insight/Judgement] : insight and judgment were intact [de-identified] : Irregular rhythm; 2/6 systolic murmur

## 2024-01-10 NOTE — HISTORY OF PRESENT ILLNESS
[FreeTextEntry1] : Follow-up [de-identified] : Mr. Quintero presents for follow-up evaluation.  He remains morbidly obese.  He does complain of bilateral knee pain.  He has had steroid injections in the knees by Dr. Irizarry.  He continues on BiPAP for significant obstructive sleep.  Patient has bilateral chronic lower extremity edema.  He had recent cardiology follow-up with Dr. Arnold and Dr. Alanis.  Mr. Quintero did have a cardiac catheterization.  He currently has a loop recorder and has been recommended to have a pacemaker/AICD placed.

## 2024-01-26 ENCOUNTER — RX RENEWAL (OUTPATIENT)
Age: 84
End: 2024-01-26

## 2024-01-26 RX ORDER — WARFARIN 5 MG/1
5 TABLET ORAL
Qty: 180 | Refills: 2 | Status: ACTIVE | COMMUNITY
Start: 2017-10-02 | End: 1900-01-01

## 2024-02-05 ENCOUNTER — RX RENEWAL (OUTPATIENT)
Age: 84
End: 2024-02-05

## 2024-02-06 RX ORDER — FLUTICASONE PROPIONATE 50 UG/1
50 SPRAY, METERED NASAL
Qty: 48 | Refills: 2 | Status: ACTIVE | COMMUNITY
Start: 2017-06-29 | End: 1900-01-01

## 2024-02-07 DIAGNOSIS — I82.5Y9 CHRONIC EMBOLISM AND THROMBOSIS OF UNSPECIFIED DEEP VEINS OF UNSPECIFIED PROXIMAL LOWER EXTREMITY: ICD-10-CM

## 2024-02-08 ENCOUNTER — RX RENEWAL (OUTPATIENT)
Age: 84
End: 2024-02-08

## 2024-02-08 RX ORDER — MONTELUKAST 10 MG/1
10 TABLET, FILM COATED ORAL
Qty: 90 | Refills: 1 | Status: ACTIVE | COMMUNITY
Start: 2017-07-18 | End: 1900-01-01

## 2024-02-28 NOTE — PHYSICAL EXAM
[No Acute Distress] : no acute distress [Well Nourished] : well nourished epistaxis [Well Developed] : well developed [Well-Appearing] : well-appearing [PERRL] : pupils equal round and reactive to light [Normal Sclera/Conjunctiva] : normal sclera/conjunctiva [EOMI] : extraocular movements intact [Normal Outer Ear/Nose] : the outer ears and nose were normal in appearance [Normal Oropharynx] : the oropharynx was normal [No JVD] : no jugular venous distention [No Lymphadenopathy] : no lymphadenopathy [Supple] : supple [Thyroid Normal, No Nodules] : the thyroid was normal and there were no nodules present [No Respiratory Distress] : no respiratory distress  [No Accessory Muscle Use] : no accessory muscle use [Clear to Auscultation] : lungs were clear to auscultation bilaterally [Normal Rate] : normal rate  [Regular Rhythm] : with a regular rhythm [Normal S1, S2] : normal S1 and S2 [No Murmur] : no murmur heard [No Carotid Bruits] : no carotid bruits [No Abdominal Bruit] : a ~M bruit was not heard ~T in the abdomen [No Varicosities] : no varicosities [Pedal Pulses Present] : the pedal pulses are present [No Edema] : there was no peripheral edema [No Palpable Aorta] : no palpable aorta [No Extremity Clubbing/Cyanosis] : no extremity clubbing/cyanosis [Soft] : abdomen soft [Non Tender] : non-tender [Non-distended] : non-distended [No Masses] : no abdominal mass palpated [No HSM] : no HSM [Normal Bowel Sounds] : normal bowel sounds [Normal Posterior Cervical Nodes] : no posterior cervical lymphadenopathy [Normal Anterior Cervical Nodes] : no anterior cervical lymphadenopathy [No CVA Tenderness] : no CVA  tenderness [No Spinal Tenderness] : no spinal tenderness [No Joint Swelling] : no joint swelling [Grossly Normal Strength/Tone] : grossly normal strength/tone [No Rash] : no rash [Coordination Grossly Intact] : coordination grossly intact [No Focal Deficits] : no focal deficits [Normal Gait] : normal gait [Deep Tendon Reflexes (DTR)] : deep tendon reflexes were 2+ and symmetric [Normal Affect] : the affect was normal [Normal Insight/Judgement] : insight and judgment were intact

## 2024-04-01 ENCOUNTER — NON-APPOINTMENT (OUTPATIENT)
Age: 84
End: 2024-04-01

## 2024-04-01 ENCOUNTER — APPOINTMENT (OUTPATIENT)
Dept: INTERNAL MEDICINE | Facility: CLINIC | Age: 84
End: 2024-04-01

## 2024-04-01 LAB
INR PPP: 3.04 RATIO
PT BLD: 33.3 SEC

## 2024-04-02 LAB
INR PPP: 3.2 RATIO
PT BLD: 35 SEC

## 2024-04-02 RX ORDER — DUTASTERIDE 0.5 MG/1
0.5 CAPSULE, LIQUID FILLED ORAL
Qty: 90 | Refills: 0 | Status: ACTIVE | COMMUNITY
Start: 2021-12-22 | End: 1900-01-01

## 2024-04-02 RX ORDER — ALFUZOSIN HYDROCHLORIDE 10 MG/1
10 TABLET, EXTENDED RELEASE ORAL
Qty: 90 | Refills: 0 | Status: ACTIVE | COMMUNITY
Start: 2021-12-22 | End: 1900-01-01

## 2024-04-12 LAB
INR PPP: 2.07 RATIO
PT BLD: 22.9 SEC

## 2024-04-26 ENCOUNTER — NON-APPOINTMENT (OUTPATIENT)
Age: 84
End: 2024-04-26

## 2024-04-29 ENCOUNTER — NON-APPOINTMENT (OUTPATIENT)
Age: 84
End: 2024-04-29

## 2024-04-29 LAB
25(OH)D3 SERPL-MCNC: 49.8 NG/ML
INR PPP: 2.7 RATIO
PT BLD: 29.6 SEC

## 2024-05-01 ENCOUNTER — RX RENEWAL (OUTPATIENT)
Age: 84
End: 2024-05-01

## 2024-05-01 RX ORDER — TRIAMCINOLONE ACETONIDE 1 MG/G
0.1 OINTMENT TOPICAL TWICE DAILY
Qty: 80 | Refills: 0 | Status: ACTIVE | COMMUNITY
Start: 2022-03-29 | End: 1900-01-01

## 2024-05-14 ENCOUNTER — RESULT REVIEW (OUTPATIENT)
Age: 84
End: 2024-05-14

## 2024-05-14 ENCOUNTER — OUTPATIENT (OUTPATIENT)
Dept: OUTPATIENT SERVICES | Facility: HOSPITAL | Age: 84
LOS: 1 days | End: 2024-05-14
Payer: MEDICARE

## 2024-05-14 VITALS
HEIGHT: 69 IN | WEIGHT: 307.1 LBS | HEART RATE: 48 BPM | DIASTOLIC BLOOD PRESSURE: 58 MMHG | RESPIRATION RATE: 18 BRPM | TEMPERATURE: 98 F | SYSTOLIC BLOOD PRESSURE: 135 MMHG | OXYGEN SATURATION: 99 %

## 2024-05-14 DIAGNOSIS — Z98.890 OTHER SPECIFIED POSTPROCEDURAL STATES: Chronic | ICD-10-CM

## 2024-05-14 DIAGNOSIS — Z01.818 ENCOUNTER FOR OTHER PREPROCEDURAL EXAMINATION: ICD-10-CM

## 2024-05-14 DIAGNOSIS — Z95.828 PRESENCE OF OTHER VASCULAR IMPLANTS AND GRAFTS: Chronic | ICD-10-CM

## 2024-05-14 DIAGNOSIS — I47.20 VENTRICULAR TACHYCARDIA, UNSPECIFIED: ICD-10-CM

## 2024-05-14 DIAGNOSIS — Z90.89 ACQUIRED ABSENCE OF OTHER ORGANS: Chronic | ICD-10-CM

## 2024-05-14 DIAGNOSIS — Z90.49 ACQUIRED ABSENCE OF OTHER SPECIFIED PARTS OF DIGESTIVE TRACT: Chronic | ICD-10-CM

## 2024-05-14 LAB
ANION GAP SERPL CALC-SCNC: 2 MMOL/L — LOW (ref 5–17)
APTT BLD: 38.6 SEC — HIGH (ref 24.5–35.6)
BASOPHILS # BLD AUTO: 0.04 K/UL — SIGNIFICANT CHANGE UP (ref 0–0.2)
BASOPHILS NFR BLD AUTO: 0.6 % — SIGNIFICANT CHANGE UP (ref 0–2)
BUN SERPL-MCNC: 22 MG/DL — SIGNIFICANT CHANGE UP (ref 7–23)
CALCIUM SERPL-MCNC: 9.1 MG/DL — SIGNIFICANT CHANGE UP (ref 8.5–10.1)
CHLORIDE SERPL-SCNC: 108 MMOL/L — SIGNIFICANT CHANGE UP (ref 96–108)
CO2 SERPL-SCNC: 31 MMOL/L — SIGNIFICANT CHANGE UP (ref 22–31)
CREAT SERPL-MCNC: 1.12 MG/DL — SIGNIFICANT CHANGE UP (ref 0.5–1.3)
EGFR: 66 ML/MIN/1.73M2 — SIGNIFICANT CHANGE UP
EOSINOPHIL # BLD AUTO: 0.29 K/UL — SIGNIFICANT CHANGE UP (ref 0–0.5)
EOSINOPHIL NFR BLD AUTO: 4.1 % — SIGNIFICANT CHANGE UP (ref 0–6)
GLUCOSE SERPL-MCNC: 96 MG/DL — SIGNIFICANT CHANGE UP (ref 70–99)
HCT VFR BLD CALC: 41.3 % — SIGNIFICANT CHANGE UP (ref 39–50)
HGB BLD-MCNC: 13.2 G/DL — SIGNIFICANT CHANGE UP (ref 13–17)
IMM GRANULOCYTES NFR BLD AUTO: 0.3 % — SIGNIFICANT CHANGE UP (ref 0–0.9)
INR BLD: 2.61 RATIO — HIGH (ref 0.85–1.18)
LYMPHOCYTES # BLD AUTO: 1.03 K/UL — SIGNIFICANT CHANGE UP (ref 1–3.3)
LYMPHOCYTES # BLD AUTO: 14.6 % — SIGNIFICANT CHANGE UP (ref 13–44)
MCHC RBC-ENTMCNC: 29.9 PG — SIGNIFICANT CHANGE UP (ref 27–34)
MCHC RBC-ENTMCNC: 32 GM/DL — SIGNIFICANT CHANGE UP (ref 32–36)
MCV RBC AUTO: 93.4 FL — SIGNIFICANT CHANGE UP (ref 80–100)
MONOCYTES # BLD AUTO: 0.37 K/UL — SIGNIFICANT CHANGE UP (ref 0–0.9)
MONOCYTES NFR BLD AUTO: 5.3 % — SIGNIFICANT CHANGE UP (ref 2–14)
NEUTROPHILS # BLD AUTO: 5.29 K/UL — SIGNIFICANT CHANGE UP (ref 1.8–7.4)
NEUTROPHILS NFR BLD AUTO: 75.1 % — SIGNIFICANT CHANGE UP (ref 43–77)
PLATELET # BLD AUTO: 138 K/UL — LOW (ref 150–400)
POTASSIUM SERPL-MCNC: 3.9 MMOL/L — SIGNIFICANT CHANGE UP (ref 3.5–5.3)
POTASSIUM SERPL-SCNC: 3.9 MMOL/L — SIGNIFICANT CHANGE UP (ref 3.5–5.3)
PROTHROM AB SERPL-ACNC: 28.7 SEC — HIGH (ref 9.5–13)
RBC # BLD: 4.42 M/UL — SIGNIFICANT CHANGE UP (ref 4.2–5.8)
RBC # FLD: 13.2 % — SIGNIFICANT CHANGE UP (ref 10.3–14.5)
SODIUM SERPL-SCNC: 141 MMOL/L — SIGNIFICANT CHANGE UP (ref 135–145)
WBC # BLD: 7.04 K/UL — SIGNIFICANT CHANGE UP (ref 3.8–10.5)
WBC # FLD AUTO: 7.04 K/UL — SIGNIFICANT CHANGE UP (ref 3.8–10.5)

## 2024-05-14 PROCEDURE — 36415 COLL VENOUS BLD VENIPUNCTURE: CPT

## 2024-05-14 PROCEDURE — 71046 X-RAY EXAM CHEST 2 VIEWS: CPT | Mod: 26

## 2024-05-14 PROCEDURE — 85730 THROMBOPLASTIN TIME PARTIAL: CPT

## 2024-05-14 PROCEDURE — 87640 STAPH A DNA AMP PROBE: CPT

## 2024-05-14 PROCEDURE — 93010 ELECTROCARDIOGRAM REPORT: CPT

## 2024-05-14 PROCEDURE — 99214 OFFICE O/P EST MOD 30 MIN: CPT | Mod: 25

## 2024-05-14 PROCEDURE — 85610 PROTHROMBIN TIME: CPT

## 2024-05-14 PROCEDURE — 71046 X-RAY EXAM CHEST 2 VIEWS: CPT

## 2024-05-14 PROCEDURE — 93005 ELECTROCARDIOGRAM TRACING: CPT

## 2024-05-14 PROCEDURE — 87641 MR-STAPH DNA AMP PROBE: CPT

## 2024-05-14 PROCEDURE — 85025 COMPLETE CBC W/AUTO DIFF WBC: CPT

## 2024-05-14 PROCEDURE — 80048 BASIC METABOLIC PNL TOTAL CA: CPT

## 2024-05-14 RX ORDER — LORATADINE 10 MG/1
1 TABLET ORAL
Refills: 0 | DISCHARGE

## 2024-05-14 RX ORDER — MONTELUKAST 4 MG/1
1 TABLET, CHEWABLE ORAL
Refills: 0 | DISCHARGE

## 2024-05-14 NOTE — H&P PST ADULT - ASSESSMENT
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 He is scheduled for ICD implant with Dr. Brady.      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 He is scheduled for ICD implant with Dr. Brady.   Plan  1. Stop all NSAIDS, herbal supplements and vitamins for 7 days.  2. NPO at midnight  3. Pre procedure medication instructions as per EP MD/NP  4. Use EZ sponges as directed  5. Use mupirocin as directed  6. Labs, EKG, CXR as per EP MD/NP orders.

## 2024-05-14 NOTE — H&P PST ADULT - NEGATIVE RESPIRATORY AND THORAX SYMPTOMS
Planned for colonoscopy this morning, however procedure cancelled due to respiratory status. Risks/benefit discussed with anesthesia and pulmonary. Patient high risk for complications/intubation form anesthesia perspective and at baseline function from pulmonary perspective. Discussed with patient risks of procedure including intubation and difficulty with extubation vs risks of recurrent bleeding/underlying bleeding lesion/recurrence of bleeding with patient. At this time I feel that risks of procedure outweigh benefits given stable hemoglobin and not active signs of bleeding. After our discussion patient agrees at this time. Will advance diet and if hgb stable plan for discharge tomorrow. Patient would benefit with outpatient GI follow up to reassess risk of recurrent GI bleeding as well as to discuss risks/benefits of ongoing CRC screening whether from colonoscopy or less invasive testing giving her underlying lung disease.     Prem Lyle MD  Gastroenterology  11:02 PM, 9/16/2021    
no wheezing/no dyspnea/no cough

## 2024-05-14 NOTE — H&P PST ADULT - HISTORY OF PRESENT ILLNESS
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 Pt on wheelchair, came with his wife. Pt ILR interrogation showed multiple episodes of  NSVT.  Pt  reports occasional SOB, he denies chest pain, dizziness, palpitations He is scheduled for ICD implant with Dr. Brady.    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 Pt on wheelchair, came with his wife. Pt ILR interrogation showed multiple episodes of  NSVT.  Pt  reports occasional SOB, he denies chest pain, dizziness, palpitations He is scheduled for ICD implant with Dr. Alanis.

## 2024-05-14 NOTE — H&P PST ADULT - NSICDXPASTMEDICALHX_GEN_ALL_CORE_FT
PAST MEDICAL HISTORY:  Afib     DVT, lower extremity     History of loop recorder     HLD (hyperlipidemia)     HTN (hypertension)     May-Thurner syndrome     Morbid obesity     Sleep apnea     Venous insufficiency     Ventricular tachycardia

## 2024-05-14 NOTE — H&P PST ADULT - IS PATIENT PREGNANT?
"[facility], [place]    Hematology / Oncology Progress Note    Date of Admission: 10/27/2017  Hospital Day #: 3   Date of Service (when I saw the patient): 10/30/2017     Assessment & Plan   Tisha Arias is a 58 year old female with high grade DLBCL and PMHx significant for breast cancer s/p bilateral mastectomies & BENJIE/BSO, papillary thyroid cancer s/p total thyroidectomy, chronic chest wall pain, muscle spasms, asthma and h/o Rachael en Y gastric bypass, who presents for Cycle 2 of DA-R-EPOCH.      HEME  # High grade B cell lymphoma. \"Double hit-like\". Primary is Dr. Sauceda. Patient diagnosed August 2017, when she presented with worsening chest and back pain. Found to have a lytic lesion of right 10th rib with extension. Disease localized above the diaphragm in thoracic and paravertebral soft tissue and mediastinal lymphadenopathy. 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 Staging LP and BMBx were negative for lymphoma. S/p Cycle 1 DA-R-EPOCH c/b mucositis and chronic LE peripheral edema. She presents for admission for Cycle 2 DA-R-EPOCH.  -Port-a-cath in place  -CT C/A/P 10/27/17 showing significant interval improvement in mediastinal and thoracic masses  -Will send notes to Dr. Sauceda, care coordinator and place call to patient relations regarding Elvin's frustration with care coordination and wait time until chemo started. We will accelerate her chemo schedule in attempt to compensate.       Treatment plan: Cycle 2 DA-R-EPOCH (Day 1-10/27/17).  Today is day 3. (Days accelerated to 23hours, discussed with pharmacy)  -Rituxan 750 mg (Day 0)  -Prednisone 60 mg  (Day 1-5)  -Etoposide 100 mg CIVI (Day 1-4)  -Vincristine 0.8 mg CIVI (Day 1-4)  -Cyclophosphamide 1500 mg (Day 5)  -LP with IT chemotherapy scheduled Monday 10/30 @0830. CSF results " pending.   Premeds: Tylenol and benadryl prior to Rituxan, Zofran (D1-5), Emend (d5), Dex (D6-7).  -Neulasta on discharge (11/1)  -Discharge with D6-7 Dexamethasone      #Anemia. 2/2 disease and chemotherapy.  -Transfuse for Hgb <8 and plts <10K.      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 Follows with Dr. Crawley. Diagnosed in 2011. Oncotype recurrence score of 11 (7% recurrence risk after 5 years of tamoxifen). S/p bilateral mastectomies and BENJIE/BSO in 2012. For endocrine therapy she was on Arimidex from 2107-0342, then changed to Tamoxifen b/c of arthralgias on Arimidex. Has been on Tamoxifen since 2014 now held while being treated for lymphoma. Last dose 10/5.   -F/U with Dr. Crawley 11/27/17.      # Papillary thyroid cancer. Follows with Dr. Castro. Diagnosed in 2013. S/p total thyroidectomy and CND. Received ETOH treatment August 2014, October 2014 and December 2014. Has post-surgical hypothyroidism.  -Continue PTA Levothyroxine.  -Continue PTA calcitriol.      GI  # H/o Rachael en Y gastric bypass. Likely affecting absorption, thus patient is on multiple supplements.  -Continue Calcium and Magnesium replacements.    #Constipation, chronic.  -PTA miralax and docusate scheduled.  -Tried PTA meds + dulcolax and senna-S yesterday with mild results. Will try lactulose today.      Pain  # Chronic chest wall pain after mastectomy.   -Continue MS Contin 30 mg morning and afternoon and 60 mg at night.  -Oxycodone 30 mg q4hrs prn.  -Celebrex 200 BID.   -Lidocaine and Ketoralac creams prn.      #Chronic muscle cramps.   -Continue KCl 20 mEq daily, Robaxin 750 mg QID, Valium 5 mg TID prn, Flexeril prn.      ID  #Anti-infectives.  -Continue PTA acyclovir and fluconazole.  -Will hold Levaquin ppx as patient is not neutropenic. To resume on discharge or when ANC <1000.      MISC  #Lymphedema. Of LE. B/l. Wearing compression stockings.   -Continue PTA HCTZ  -Weight mildly up since admission, 85.1-->87.3  kg.  -Increased  lasix to BID  -Monitor BMP and weight daily      #Asthma, allergies.   -Continue PTA singular, zyrtec.  -Patient states she is not taking her inhalers. Requests only albuterol prn rescue.      FEN  -IVF per chemo regimen  -PTA electrolyte replacements, can consider sliding scale if necessary.  -RADT      Prophys  -VTE: Lovenox ppx, held prior to procedure. Will resume this evening at 2000.  -PUD: PTA ranitidine  -Bowels: PTA docusate and miralax scheduled, senokot-s scheduled, added dulocolax prn. Lactulose x1 today.    Code: Full     Dispo: Pending completion of chemotherapy and resolution of any acute toxicities, likely 4-5 day stay, will likely discharge Tuesday.     Tiesha Brenard PA-C  Hematology/Oncology  [phone]    Interval History   Elvin states she was frustrated about the weekend and how everything flowed on Friday. Voiced my understanding. Concerned about losing her job. Ensured patient we were doing what we could (i.e. Speeding up chemo, etc) to discharge her as early as possible Tuesday. Pain is well managed on PTA regimen. Wt mildly increased today, but LE edema appears stable. Bowels continue to be constipated.     Physical Exam   Temp: 98.2  F (36.8  C) Temp src: Oral BP: 130/75 Pulse: 81 Heart Rate: 85 Resp: 18 SpO2: 95 % O2 Device: None (Room air)    Vitals:    10/28/17 0700 10/29/17 0735 10/30/17 0700   Weight: 85.3 kg (188 lb) 86.4 kg (190 lb 8 oz) 87.3 kg (192 lb 6.4 oz)     Vital Signs with Ranges  Temp:  [95.2  F (35.1  C)-98.2  F (36.8  C)] 98.2  F (36.8  C)  Pulse:  [80-81] 81  Heart Rate:  [] 85  Resp:  [18] 18  BP: (123-143)/(59-75) 130/75  SpO2:  [95 %-98 %] 95 %  I/O last 3 completed shifts:  In: 5804 [P.O.:4020; IV Piggyback:1784]  Out: 4125 [Urine:4125]    Constitutional: Alert and interactive female seen sitting in chair, in NAD.   HEENT: NCAT. Anicteric sclera, conjunctiva clear. Moist mucous membranes without lesions or thrush.   Respiratory: Non-labored  breathing, good air exchange, lungs are clear to auscultation bilaterally. No wheezing or rales.  Cardiovascular: Tachycardic but regular rhythm. No murmur or rub.   GI: Normoactive bowel sounds. Abdomen soft, non-distended, and non-tender. No rigidity or guarding.  Skin: Warm and dry. No concerning lesions or rash on exposed surfaces.  Musculoskeletal: Extremities grossly normal, with 1-2+ pitting edema of LE bilaterally to mid calf-knee. Good strength and ROM in chair. Independent throughout room.  Neurologic: A&O x 3, speech normal, gait normal, sensation to light touch grossly WNL. Grossly non-focal.  Neuropsychiatric: Mentation and affect appear normal/appropriate.  Vascular Access: Port is CDI without erythema, swelling, or discharge.     Medications   Current Facility-Administered Medications   Medication     lactulose (CEPHULAC) Packet 20 g     sodium chloride (PF) 0.9% PF flush 20 mL     diazepam (VALIUM) tablet 5 mg     furosemide (LASIX) tablet 40 mg     senna-docusate (SENOKOT-S;PERICOLACE) 8.6-50 MG per tablet 2 tablet     bisacodyl (DULCOLAX) EC tablet 15 mg     Medication Instruction     polyethylene glycol (MIRALAX/GLYCOLAX) Packet 17 g     ondansetron (ZOFRAN) tablet 16 mg     [START ON 10/31/2017] fosaprepitant (EMEND) 150 mg in NaCl 0.9 % intermittent infusion     [START ON 11/1/2017] dexamethasone (DECADRON) tablet 8 mg     predniSONE (DELTASONE) tablet 60 mg     Chemotherapy Infusing-Continuous Infusion     etoposide (TOPOSAR) 100 mg in NaCl 0.9 % 555 mL CHEMOTHERAPY     vinCRIStine (ONCOVIN) 0.8 mg, DOXOrubicin (ADRIAMYCIN) 20 mg in NaCl 0.9 % 1,061 mL CHEMOTHERAPY     [START ON 10/31/2017] cyclophosphamide (CYTOXAN) 1,500 mg in NaCl 0.9 % 625 mL CHEMOTHERAPY     [START ON 11/1/2017] filgrastim 15 mcg/mL (in Dextrose) (NEUPOGEN) infusion 400 mcg     prochlorperazine (COMPAZINE) tablet 10 mg     prochlorperazine (COMPAZINE) injection 10 mg     LORazepam (ATIVAN) tablet 0.5-1 mg     LORazepam  (ATIVAN) injection 0.5-1 mg     MEDICATION INSTRUCTION     methylPREDNISolone sodium succinate (solu-MEDROL) injection 125 mg     diphenhydrAMINE (BENADRYL) injection 50 mg     meperidine (DEMEROL) injection 25 mg     EPINEPHrine PF (ADRENALIN) injection 0.3 mg     albuterol (PROAIR HFA/PROVENTIL HFA/VENTOLIN HFA) Inhaler 1-2 puff     albuterol neb solution 2.5 mg     0.9% sodium chloride infusion     naloxone (NARCAN) injection 0.1-0.4 mg     acyclovir (ZOVIRAX) tablet 400 mg     allopurinol (ZYLOPRIM) tablet 300 mg     calcitRIOL (ROCALTROL) capsule 0.5 mcg     calcium carbonate (TUMS) chewable tablet 500-1,000 mg     cetirizine (zyrTEC) tablet 10 mg     cromolyn (OPTICROM) 4 % ophthalmic solution 1 drop     diclofenac (VOLTAREN) 1 % topical gel 2 g     diphenhydrAMINE (BENADRYL) capsule 50 mg     docusate sodium (COLACE) capsule 100 mg     ferrous sulfate (IRON) tablet 325 mg     fluconazole (DIFLUCAN) tablet 200 mg     hydrochlorothiazide (MICROZIDE) capsule 12.5 mg     magic mouthwash suspension (diphenhydrAMINE, lidocaine, aluminum-magnesium & simethicone)     montelukast (SINGULAIR) tablet 20 mg     morphine (MS CONTIN) 12 hr tablet 30 mg     morphine (MS CONTIN) 12 hr tablet 60 mg     oxyCODONE (ROXICODONE) IR tablet 30 mg     polyethylene glycol (MIRALAX/GLYCOLAX) Packet 17 g     potassium chloride SA (K-DUR/KLOR-CON M) CR tablet 20 mEq     ranitidine (ZANTAC) tablet 75 mg     SUMAtriptan (IMITREX) tablet 50 mg     triamcinolone (KENALOG) 0.025 % ointment     albuterol (PROAIR HFA/PROVENTIL HFA/VENTOLIN HFA) Inhaler 2 puff     cyclobenzaprine (FLEXERIL) tablet 10 mg     levothyroxine (SYNTHROID/LEVOTHROID) tablet 224 mcg     levothyroxine (SYNTHROID/LEVOTHROID) tablet 112 mcg     lidocaine (XYLOCAINE) 5 % ointment     Digestzymes capsules     methocarbamol (ROBAXIN) tablet 750 mg     Vitamin D3 5000 units vegetarian capsules     calcitRIOL (ROCALTROL) capsule 0.5 mcg     celecoxib (celeBREX) capsule 200 mg      Muscle Mag capsules     Calcium D-Glucarate capsules       Data   CBC  Recent Labs  Lab 10/30/17  0535 10/29/17  0607 10/28/17  0625 10/27/17  0659   WBC 8.5 9.5 9.9 10.4   RBC 3.13* 3.16* 3.60* 3.90   HGB 8.1* 8.2* 9.2* 10.0*   HCT 27.2* 27.4* 31.2* 33.6*   MCV 87 87 87 86   MCH 25.9* 25.9* 25.6* 25.6*   MCHC 29.8* 29.9* 29.5* 29.8*   RDW 20.2* 19.9* 19.7* 19.3*    201 234 229     CMP  Recent Labs  Lab 10/30/17  0535 10/29/17  0607 10/28/17  0625 10/27/17  0659 10/23/17  1335    137 141 140 136   POTASSIUM 3.4 3.4 3.6 3.8 3.6   CHLORIDE 105 100 103 102 94   CO2 30 29 28 32 31   ANIONGAP 8 8 10 6 12   * 155* 162* 112* 233*   BUN 15 14 14 20 22   CR 0.70 0.82 0.90 0.93 1.01   GFRESTIMATED 86 72 65 62 56*   GFRESTBLACK >90 88 78 75 68   ELMO 7.2* 7.7* 7.6* 8.0* 7.4*   MAG 2.2  --   --   --   --    PHOS 3.4 4.1 4.6* 3.0  --    PROTTOTAL  --   --   --  5.7* 5.6*   ALBUMIN  --   --   --  3.0* 2.9*   BILITOTAL  --   --   --  0.3 0.3   ALKPHOS  --   --   --  77 97   AST  --   --   --  29 26   ALT  --   --   --  53* 44     INR  Recent Labs  Lab 10/30/17  0535 10/29/17  0607 10/28/17  0625   INR 0.96 1.02 0.94       Results for orders placed or performed during the hospital encounter of 10/27/17   X-ray Lumbar punc for intrathecal chemo admin    Narrative    Lumbar Puncture using Fluoroscopy    History:  LP with IT chemotherapy, DLBCL.    Procedure note: Verbal and written consent for lumbar puncture was  obtained from the patient, and benefits and risk of the procedure were  explained, including but not limited to worsening headache,  hemorrhage, infection, lower extremity pain, or nerve root injury. The  patient was sterilely prepped and draped with the patient in the prone  position, over the lower back. Under fluoroscopic guidance, the  interlaminar spaces were noted. 1% lidocaine was administered for  local anesthetic over the L2-3 interlaminar space, and a 22 gauge 3.5  inch needle was advanced  into the thecal sac under fluoroscopic  guidance.  There was initial show of clear CSF. Approximately 8 cc of  CSF were collected. Following which, intrathecal chemotherapy  administered by the oncology provider.    The needle was removed with the stylet in place. There was no  immediate complication associated with the procedure. Samples were  sent for the requested laboratory testing.      Estimated blood loss: Less than 1 cc.    Fluoroscopic time: 19 seconds      Impression    Impression: Successful lumbar puncture without immediate complication.     CT Chest/Abdomen/Pelvis w Contrast    Narrative    EXAMINATION: CT CHEST/ABDOMEN/PELVIS W CONTRAST, 10/27/2017 6:36 PM    TECHNIQUE:  Helical CT images from the thoracic inlet through the  symphysis pubis were obtained  with contrast. Contrast dose: iopamidol  (ISOVUE-370) solution 115 mL    COMPARISON: PET/CT on 9/6/2017    HISTORY: evaluate for progression of disease    FINDINGS:    Chest: Right IJ Port-A-Cath tip is in the high right atrium. Bilateral  breast implants. Heart size is within normal limits. No pericardial  effusion. No significant hilar or axillary lymphadenopathy. The  previously noted hypermetabolic soft tissue focus anterolateral to the  descending thoracic aorta noted on 9/6/2017 PET/CT is smaller, now  measuring 1.0 cm short axis, previously 1.9 cm short axis. Soft tissue  mass, previously involving the right 10th rib with extension into the  neural foramen has decreased in size, measuring 2.3 cm, previously 3.4  cm. No evidence of pleural effusion or pneumothorax.    Abdomen and pelvis: Postsurgical changes of cholecystectomy with mild  intra and extrahepatic biliary dilatation. The common bile duct  measures approximately 15 mm in diameter. Otherwise the liver is  unremarkable Postsurgical changes of Rachael-en-Y gastric surgery. The  spleen, pancreas, adrenal glands. Small nonobstructing right kidney  stone. Otherwise both kidneys are  unremarkable. No abnormally dilated  bowel loops. No significant free fluid in the abdomen pelvis. No free  peritoneal portal venous gas. Moderate amount of stool in the colon.  No significant retroperitoneal or mesenteric lymphadenopathy. The  previously noted soft tissue focus of increased radiotracer uptake  noted on 9/6/2017 exam is no longer seen.    Bones and soft tissues: Vertebral hemangioma at the level of T9 and  L4, unchanged. Destructive lesion involving the medial right 10th rib  as described above.      Impression    IMPRESSION:   1. Interval response to therapy is indicated by decreased size of  mediastinal lymphadenopathy and right chest wall mass eroding the  right 10th rib.  2. Moderate amount of stool throughout the colon, nonspecific, can be  seen with constipation.  3. Tiny nonobstructing right kidney stone.    I have personally reviewed the examination and initial interpretation  and I agree with the findings.    DAVID AVALOS MD     *Note: Due to a large number of results and/or encounters for the requested time period, some results have not been displayed. A complete set of results can be found in Results Review.        not applicable (Male)

## 2024-05-14 NOTE — H&P PST ADULT - NSICDXPASTSURGICALHX_GEN_ALL_CORE_FT
PAST SURGICAL HISTORY:  H/O colonoscopy     History of loop recorder     S/P cholecystectomy     S/P IVC filter     S/P tonsillectomy     Status post insertion of iliac artery stent

## 2024-05-14 NOTE — H&P PST ADULT - NSICDXFAMILYHX_GEN_ALL_CORE_FT
FAMILY HISTORY:  Father  Still living? Unknown  Family history of cerebrovascular accident (CVA) in father, Age at diagnosis: Age Unknown  Family history of diabetes mellitus (DM), Age at diagnosis: Age Unknown    Uncle  Still living? Unknown  Family history of diabetes mellitus (DM), Age at diagnosis: Age Unknown

## 2024-05-15 ENCOUNTER — APPOINTMENT (OUTPATIENT)
Dept: UROLOGY | Facility: CLINIC | Age: 84
End: 2024-05-15
Payer: MEDICARE

## 2024-05-15 VITALS
WEIGHT: 302 LBS | HEIGHT: 69 IN | BODY MASS INDEX: 44.73 KG/M2 | DIASTOLIC BLOOD PRESSURE: 69 MMHG | SYSTOLIC BLOOD PRESSURE: 147 MMHG

## 2024-05-15 DIAGNOSIS — I47.20 VENTRICULAR TACHYCARDIA, UNSPECIFIED: ICD-10-CM

## 2024-05-15 DIAGNOSIS — Z01.818 ENCOUNTER FOR OTHER PREPROCEDURAL EXAMINATION: ICD-10-CM

## 2024-05-15 DIAGNOSIS — N40.1 BENIGN PROSTATIC HYPERPLASIA WITH LOWER URINARY TRACT SYMPMS: ICD-10-CM

## 2024-05-15 DIAGNOSIS — N28.1 CYST OF KIDNEY, ACQUIRED: ICD-10-CM

## 2024-05-15 DIAGNOSIS — N13.8 BENIGN PROSTATIC HYPERPLASIA WITH LOWER URINARY TRACT SYMPMS: ICD-10-CM

## 2024-05-15 DIAGNOSIS — R97.20 ELEVATED PROSTATE, SPECIFIC ANTIGEN [PSA]: ICD-10-CM

## 2024-05-15 DIAGNOSIS — N52.9 MALE ERECTILE DYSFUNCTION, UNSPECIFIED: ICD-10-CM

## 2024-05-15 PROBLEM — Z98.890 OTHER SPECIFIED POSTPROCEDURAL STATES: Chronic | Status: ACTIVE | Noted: 2024-05-14

## 2024-05-15 PROBLEM — I82.409 ACUTE EMBOLISM AND THROMBOSIS OF UNSPECIFIED DEEP VEINS OF UNSPECIFIED LOWER EXTREMITY: Chronic | Status: ACTIVE | Noted: 2024-05-14

## 2024-05-15 PROBLEM — E66.01 MORBID (SEVERE) OBESITY DUE TO EXCESS CALORIES: Chronic | Status: ACTIVE | Noted: 2024-05-14

## 2024-05-15 LAB
MRSA PCR RESULT.: SIGNIFICANT CHANGE UP
S AUREUS DNA NOSE QL NAA+PROBE: DETECTED

## 2024-05-15 PROCEDURE — 99214 OFFICE O/P EST MOD 30 MIN: CPT

## 2024-05-15 NOTE — CHART NOTE - NSCHARTNOTEFT_GEN_A_CORE
Patient positive for MSSA; Instructed pt to start mupirocin on 5/15-5/19,2025 . Pt instructed to read card with instructions for mupirocin use; Pt understood instructions with read back. Patient positive for MSSA; Instructed pt to start mupirocin on 5/15-5/19,2025 . Pt instructed to read card with instructions for mupirocin use; Pt understood instructions with read back.  Spoke with Cyndy Reyes INR 2.6 stop 1 day prior to surgery ; message given to patient

## 2024-05-17 NOTE — ASU PATIENT PROFILE, ADULT - HISTORY OF COVID-19 VACCINATION
SW/CM Ongoing Pediatric Plan of Care Note     Support Systems   Family members, Parent, Siblings    Parent/Family Goals  Patient/Family Discharge Goal: Home          Identified Barriers to Discharge  CM and medical team working on getting specialzed formula for home.    Recommendations for Discharge  Discharge home    Disposition  Home with parent    Progress Note  04/24 1240 CM spoke with Kathy and was informed that formula should be delivered to the home 04/25.  CM spoke with mom and gave her the update.    04/24 CM sent message to Kathy from [facility] to let her know that pt is to discharge today.  CM awaiting to here if formula will get delivered today to family.      04/21 Received message from Kathy [facility] liaison and Lyndsey Simmons dietician from [facility] that they can get the formula but it will not come in until Monday and then the family will receive it on Tuesday.  Will update medical team.    04/20 CM sent orders to [facility] to try to obtain [employer] free formula for home.  CM notifed Kathy from [facility] of referral.  CM awaiting to see if [facility] can obtain formula for home.  Will continue to monitor and update medical team.        ..CM following family due to patient's ongoing hospitalization. The following areas were addressed.    [facility]  Type of Home Care: [employer] vendor only for formula  Agency Name: [facility]  Contact Name: Kathy  Contact Phone Number:     Comments: CM spoke with Kathy and was informed that formula should be delivered to the home 04/25 by noon. CM spoke with mom and informed her of the delivery.  Medical team updated also.          PLAN/INTERVENTION:  1. CM to facilitate formula delivery from [facility] to patients home.  2. Collaborate with interdisciplinary team to facilitate safe DC home.   3. Goal(s) listed above have been established with patient/family and all parties are in agreement. Goals will continue to be addressed and updated as care  continues.          Aidee Gurrola, RN, BSN, CPN  Tie:        No

## 2024-05-20 ENCOUNTER — RESULT REVIEW (OUTPATIENT)
Age: 84
End: 2024-05-20

## 2024-05-20 ENCOUNTER — OUTPATIENT (OUTPATIENT)
Dept: OUTPATIENT SERVICES | Facility: HOSPITAL | Age: 84
LOS: 1 days | Discharge: HOME | End: 2024-05-20
Payer: MEDICARE

## 2024-05-20 VITALS
SYSTOLIC BLOOD PRESSURE: 118 MMHG | RESPIRATION RATE: 17 BRPM | DIASTOLIC BLOOD PRESSURE: 66 MMHG | WEIGHT: 306 LBS | OXYGEN SATURATION: 99 % | TEMPERATURE: 97 F | HEIGHT: 69 IN | HEART RATE: 47 BPM

## 2024-05-20 DIAGNOSIS — Z90.49 ACQUIRED ABSENCE OF OTHER SPECIFIED PARTS OF DIGESTIVE TRACT: Chronic | ICD-10-CM

## 2024-05-20 DIAGNOSIS — Z98.890 OTHER SPECIFIED POSTPROCEDURAL STATES: Chronic | ICD-10-CM

## 2024-05-20 DIAGNOSIS — I47.20 VENTRICULAR TACHYCARDIA, UNSPECIFIED: ICD-10-CM

## 2024-05-20 DIAGNOSIS — Z95.828 PRESENCE OF OTHER VASCULAR IMPLANTS AND GRAFTS: Chronic | ICD-10-CM

## 2024-05-20 DIAGNOSIS — Z90.89 ACQUIRED ABSENCE OF OTHER ORGANS: Chronic | ICD-10-CM

## 2024-05-20 LAB — BLD GP AB SCN SERPL QL: SIGNIFICANT CHANGE UP

## 2024-05-20 PROCEDURE — 33249 INSJ/RPLCMT DEFIB W/LEAD(S): CPT

## 2024-05-20 PROCEDURE — C1889: CPT

## 2024-05-20 PROCEDURE — C1722: CPT

## 2024-05-20 PROCEDURE — 86900 BLOOD TYPING SEROLOGIC ABO: CPT

## 2024-05-20 PROCEDURE — 33286 RMVL SUBQ CAR RHYTHM MNTR: CPT | Mod: 59

## 2024-05-20 PROCEDURE — 93010 ELECTROCARDIOGRAM REPORT: CPT

## 2024-05-20 PROCEDURE — 33286 RMVL SUBQ CAR RHYTHM MNTR: CPT

## 2024-05-20 PROCEDURE — 86850 RBC ANTIBODY SCREEN: CPT

## 2024-05-20 PROCEDURE — 71045 X-RAY EXAM CHEST 1 VIEW: CPT | Mod: 26

## 2024-05-20 PROCEDURE — 36415 COLL VENOUS BLD VENIPUNCTURE: CPT

## 2024-05-20 PROCEDURE — 71045 X-RAY EXAM CHEST 1 VIEW: CPT

## 2024-05-20 PROCEDURE — C1892: CPT

## 2024-05-20 PROCEDURE — C1894: CPT

## 2024-05-20 PROCEDURE — C1777: CPT

## 2024-05-20 PROCEDURE — 93005 ELECTROCARDIOGRAM TRACING: CPT

## 2024-05-20 PROCEDURE — 85025 COMPLETE CBC W/AUTO DIFF WBC: CPT

## 2024-05-20 PROCEDURE — 85610 PROTHROMBIN TIME: CPT

## 2024-05-20 PROCEDURE — 80048 BASIC METABOLIC PNL TOTAL CA: CPT

## 2024-05-20 PROCEDURE — 86901 BLOOD TYPING SEROLOGIC RH(D): CPT

## 2024-05-20 RX ORDER — CEFAZOLIN SODIUM 1 G
1000 VIAL (EA) INJECTION EVERY 8 HOURS
Refills: 0 | Status: DISCONTINUED | OUTPATIENT
Start: 2024-05-20 | End: 2024-05-20

## 2024-05-20 RX ORDER — POTASSIUM CHLORIDE 20 MEQ
40 PACKET (EA) ORAL DAILY
Refills: 0 | Status: DISCONTINUED | OUTPATIENT
Start: 2024-05-20 | End: 2024-05-21

## 2024-05-20 RX ORDER — MONTELUKAST 4 MG/1
1 TABLET, CHEWABLE ORAL
Refills: 0 | DISCHARGE

## 2024-05-20 RX ORDER — ATORVASTATIN CALCIUM 80 MG/1
10 TABLET, FILM COATED ORAL AT BEDTIME
Refills: 0 | Status: DISCONTINUED | OUTPATIENT
Start: 2024-05-20 | End: 2024-05-21

## 2024-05-20 RX ORDER — DUTASTERIDE 0.5 MG/1
1 CAPSULE, LIQUID FILLED ORAL
Refills: 0 | DISCHARGE

## 2024-05-20 RX ORDER — CEFAZOLIN SODIUM 1 G
1000 VIAL (EA) INJECTION EVERY 8 HOURS
Refills: 0 | Status: COMPLETED | OUTPATIENT
Start: 2024-05-20 | End: 2024-05-21

## 2024-05-20 RX ORDER — FINASTERIDE 5 MG/1
5 TABLET, FILM COATED ORAL DAILY
Refills: 0 | Status: DISCONTINUED | OUTPATIENT
Start: 2024-05-20 | End: 2024-05-21

## 2024-05-20 RX ORDER — MONTELUKAST 4 MG/1
10 TABLET, CHEWABLE ORAL DAILY
Refills: 0 | Status: DISCONTINUED | OUTPATIENT
Start: 2024-05-20 | End: 2024-05-21

## 2024-05-20 RX ORDER — ALFUZOSIN HYDROCHLORIDE 10 MG/1
1 TABLET, EXTENDED RELEASE ORAL
Refills: 0 | DISCHARGE

## 2024-05-20 RX ORDER — WARFARIN SODIUM 2.5 MG/1
1 TABLET ORAL
Refills: 0 | DISCHARGE

## 2024-05-20 RX ORDER — CEFAZOLIN SODIUM 1 G
2000 VIAL (EA) INJECTION ONCE
Refills: 0 | Status: DISCONTINUED | OUTPATIENT
Start: 2024-05-20 | End: 2024-05-20

## 2024-05-20 RX ORDER — ACETAMINOPHEN 500 MG
650 TABLET ORAL EVERY 6 HOURS
Refills: 0 | Status: DISCONTINUED | OUTPATIENT
Start: 2024-05-20 | End: 2024-05-21

## 2024-05-20 RX ORDER — FUROSEMIDE 40 MG
1 TABLET ORAL
Refills: 0 | DISCHARGE

## 2024-05-20 RX ORDER — TAMSULOSIN HYDROCHLORIDE 0.4 MG/1
0.8 CAPSULE ORAL AT BEDTIME
Refills: 0 | Status: DISCONTINUED | OUTPATIENT
Start: 2024-05-20 | End: 2024-05-21

## 2024-05-20 RX ORDER — CEFAZOLIN SODIUM 1 G
2000 VIAL (EA) INJECTION ONCE
Refills: 0 | Status: COMPLETED | OUTPATIENT
Start: 2024-05-20 | End: 2024-05-20

## 2024-05-20 RX ORDER — METOPROLOL TARTRATE 50 MG
25 TABLET ORAL DAILY
Refills: 0 | Status: DISCONTINUED | OUTPATIENT
Start: 2024-05-20 | End: 2024-05-21

## 2024-05-20 RX ORDER — FUROSEMIDE 40 MG
40 TABLET ORAL DAILY
Refills: 0 | Status: DISCONTINUED | OUTPATIENT
Start: 2024-05-20 | End: 2024-05-21

## 2024-05-20 RX ORDER — POTASSIUM CHLORIDE 20 MEQ
2 PACKET (EA) ORAL
Refills: 0 | DISCHARGE

## 2024-05-20 RX ADMIN — Medication 2000 MILLIGRAM(S): at 09:15

## 2024-05-20 RX ADMIN — Medication 1000 MILLIGRAM(S): at 18:46

## 2024-05-20 RX ADMIN — ATORVASTATIN CALCIUM 10 MILLIGRAM(S): 80 TABLET, FILM COATED ORAL at 21:12

## 2024-05-20 RX ADMIN — TAMSULOSIN HYDROCHLORIDE 0.8 MILLIGRAM(S): 0.4 CAPSULE ORAL at 21:11

## 2024-05-20 NOTE — PACU DISCHARGE NOTE - COMMENTS
Report given to Jessie on 3N.  Pt tolerated procedure well, incisions c/d/i as charted.  Pt denies chest pain, sob or pain at this time.  Pt alert and awake with wife at bedside.  Bedside monitor transported with patient and information given by Jennifer Moreno.  VSS, safety maintained

## 2024-05-21 ENCOUNTER — TRANSCRIPTION ENCOUNTER (OUTPATIENT)
Age: 84
End: 2024-05-21

## 2024-05-21 ENCOUNTER — APPOINTMENT (OUTPATIENT)
Dept: ELECTROPHYSIOLOGY | Facility: CLINIC | Age: 84
End: 2024-05-21

## 2024-05-21 VITALS
DIASTOLIC BLOOD PRESSURE: 64 MMHG | HEART RATE: 40 BPM | RESPIRATION RATE: 18 BRPM | SYSTOLIC BLOOD PRESSURE: 157 MMHG | OXYGEN SATURATION: 98 % | TEMPERATURE: 98 F

## 2024-05-21 LAB
ADD ON TEST-SPECIMEN IN LAB: SIGNIFICANT CHANGE UP
ANION GAP SERPL CALC-SCNC: 5 MMOL/L — SIGNIFICANT CHANGE UP (ref 5–17)
BASOPHILS # BLD AUTO: 0.04 K/UL — SIGNIFICANT CHANGE UP (ref 0–0.2)
BASOPHILS NFR BLD AUTO: 0.6 % — SIGNIFICANT CHANGE UP (ref 0–2)
BUN SERPL-MCNC: 24 MG/DL — HIGH (ref 7–23)
CALCIUM SERPL-MCNC: 9 MG/DL — SIGNIFICANT CHANGE UP (ref 8.5–10.1)
CHLORIDE SERPL-SCNC: 112 MMOL/L — HIGH (ref 96–108)
CO2 SERPL-SCNC: 27 MMOL/L — SIGNIFICANT CHANGE UP (ref 22–31)
CREAT SERPL-MCNC: 1.14 MG/DL — SIGNIFICANT CHANGE UP (ref 0.5–1.3)
EGFR: 64 ML/MIN/1.73M2 — SIGNIFICANT CHANGE UP
EOSINOPHIL # BLD AUTO: 0.27 K/UL — SIGNIFICANT CHANGE UP (ref 0–0.5)
EOSINOPHIL NFR BLD AUTO: 4.3 % — SIGNIFICANT CHANGE UP (ref 0–6)
GLUCOSE SERPL-MCNC: 100 MG/DL — HIGH (ref 70–99)
HCT VFR BLD CALC: 40.4 % — SIGNIFICANT CHANGE UP (ref 39–50)
HGB BLD-MCNC: 12.6 G/DL — LOW (ref 13–17)
IMM GRANULOCYTES NFR BLD AUTO: 0.5 % — SIGNIFICANT CHANGE UP (ref 0–0.9)
INR BLD: 1.18 RATIO — SIGNIFICANT CHANGE UP (ref 0.85–1.18)
LYMPHOCYTES # BLD AUTO: 1.03 K/UL — SIGNIFICANT CHANGE UP (ref 1–3.3)
LYMPHOCYTES # BLD AUTO: 16.2 % — SIGNIFICANT CHANGE UP (ref 13–44)
MCHC RBC-ENTMCNC: 29 PG — SIGNIFICANT CHANGE UP (ref 27–34)
MCHC RBC-ENTMCNC: 31.2 GM/DL — LOW (ref 32–36)
MCV RBC AUTO: 92.9 FL — SIGNIFICANT CHANGE UP (ref 80–100)
MONOCYTES # BLD AUTO: 0.43 K/UL — SIGNIFICANT CHANGE UP (ref 0–0.9)
MONOCYTES NFR BLD AUTO: 6.8 % — SIGNIFICANT CHANGE UP (ref 2–14)
NEUTROPHILS # BLD AUTO: 4.54 K/UL — SIGNIFICANT CHANGE UP (ref 1.8–7.4)
NEUTROPHILS NFR BLD AUTO: 71.6 % — SIGNIFICANT CHANGE UP (ref 43–77)
PLATELET # BLD AUTO: 125 K/UL — LOW (ref 150–400)
POTASSIUM SERPL-MCNC: 3.8 MMOL/L — SIGNIFICANT CHANGE UP (ref 3.5–5.3)
POTASSIUM SERPL-SCNC: 3.8 MMOL/L — SIGNIFICANT CHANGE UP (ref 3.5–5.3)
PROTHROM AB SERPL-ACNC: 13.3 SEC — HIGH (ref 9.5–13)
RBC # BLD: 4.35 M/UL — SIGNIFICANT CHANGE UP (ref 4.2–5.8)
RBC # FLD: 13.4 % — SIGNIFICANT CHANGE UP (ref 10.3–14.5)
SODIUM SERPL-SCNC: 144 MMOL/L — SIGNIFICANT CHANGE UP (ref 135–145)
WBC # BLD: 6.34 K/UL — SIGNIFICANT CHANGE UP (ref 3.8–10.5)
WBC # FLD AUTO: 6.34 K/UL — SIGNIFICANT CHANGE UP (ref 3.8–10.5)

## 2024-05-21 PROCEDURE — 93010 ELECTROCARDIOGRAM REPORT: CPT

## 2024-05-21 PROCEDURE — 99232 SBSQ HOSP IP/OBS MODERATE 35: CPT

## 2024-05-21 RX ORDER — METOPROLOL TARTRATE 50 MG
1 TABLET ORAL
Refills: 0 | DISCHARGE

## 2024-05-21 RX ORDER — MUPIROCIN 20 MG/G
1 OINTMENT TOPICAL
Refills: 0 | Status: DISCONTINUED | OUTPATIENT
Start: 2024-05-21 | End: 2024-05-21

## 2024-05-21 RX ORDER — MUPIROCIN 20 MG/G
1 OINTMENT TOPICAL
Qty: 0 | Refills: 0 | DISCHARGE
Start: 2024-05-21

## 2024-05-21 RX ORDER — ACETAMINOPHEN 500 MG
2 TABLET ORAL
Qty: 0 | Refills: 0 | DISCHARGE
Start: 2024-05-21

## 2024-05-21 RX ORDER — METOPROLOL TARTRATE 50 MG
12.5 TABLET ORAL DAILY
Refills: 0 | Status: DISCONTINUED | OUTPATIENT
Start: 2024-05-21 | End: 2024-05-21

## 2024-05-21 RX ORDER — METOPROLOL TARTRATE 50 MG
0.5 TABLET ORAL
Qty: 45 | Refills: 0
Start: 2024-05-21 | End: 2024-08-18

## 2024-05-21 RX ORDER — TAMSULOSIN HYDROCHLORIDE 0.4 MG/1
2 CAPSULE ORAL
Qty: 0 | Refills: 0 | DISCHARGE
Start: 2024-05-21

## 2024-05-21 RX ADMIN — Medication 1000 MILLIGRAM(S): at 01:55

## 2024-05-21 RX ADMIN — Medication 40 MILLIEQUIVALENT(S): at 09:21

## 2024-05-21 RX ADMIN — MONTELUKAST 10 MILLIGRAM(S): 4 TABLET, CHEWABLE ORAL at 09:25

## 2024-05-21 RX ADMIN — MUPIROCIN 1 APPLICATION(S): 20 OINTMENT TOPICAL at 12:01

## 2024-05-21 RX ADMIN — FINASTERIDE 5 MILLIGRAM(S): 5 TABLET, FILM COATED ORAL at 09:22

## 2024-05-21 RX ADMIN — Medication 12.5 MILLIGRAM(S): at 10:15

## 2024-05-21 NOTE — PROGRESS NOTE ADULT - NS ATTEND AMEND GEN_ALL_CORE FT
pt is s/p icd implant for sustained VT, chronic AF, pt did well post op, icd interrogation nml lead testing   bradycardia reduce metoprolol  resume oac after 24h  stable for discharge

## 2024-05-21 NOTE — DISCHARGE NOTE PROVIDER - NSDCFUSCHEDAPPT_GEN_ALL_CORE_FT
Wilfredo Carson  [facility]  [facility] [address]  Scheduled Appointment: 07/12/2024

## 2024-05-21 NOTE — PROGRESS NOTE ADULT - SUBJECTIVE AND OBJECTIVE BOX
HPI: 83 year old male with history of HTN. HLD, Afib on Coumadin,  RACHEL with BIpap, ventricular tachycardia who presents for AICD.  Patient s/p single chamber AICD POD#1.     5/21/24: Patient s/p single chamber ICD POD#1.  Denies any CP, palpitations, SOB, dizziness, lightheadedness, significant pain at site.  TELE: SB, v pacing 40s  EKG:    MEDICATIONS  (STANDING):  atorvastatin 10 milliGRAM(s) Oral at bedtime  finasteride 5 milliGRAM(s) Oral daily  furosemide    Tablet 40 milliGRAM(s) Oral daily  metoprolol succinate ER 12.5 milliGRAM(s) Oral daily  montelukast 10 milliGRAM(s) Oral daily  potassium chloride    Tablet ER 40 milliEquivalent(s) Oral daily  tamsulosin 0.8 milliGRAM(s) Oral at bedtime    MEDICATIONS  (PRN):  acetaminophen     Tablet .. 650 milliGRAM(s) Oral every 6 hours PRN Mild Pain (1 - 3), Moderate Pain (4 - 6)      Vital Signs Last 24 Hrs  T(C): 36.4 (21 May 2024 08:27), Max: 36.9 (21 May 2024 05:47)  T(F): 97.5 (21 May 2024 08:27), Max: 98.5 (21 May 2024 05:47)  HR: 40 (21 May 2024 08:27) (40 - 45)  BP: 157/64 (21 May 2024 08:27) (122/82 - 167/77)  BP(mean): 98 (21 May 2024 05:47) (98 - 98)  RR: 18 (21 May 2024 08:27) (16 - 18)  SpO2: 98% (21 May 2024 08:27) (97% - 100%)    Parameters below as of 21 May 2024 08:27  Patient On (Oxygen Delivery Method): room air        PHYSICAL EXAMINATION:  GENERAL: In no apparent distress  HEART: Regular rate and rhythm; No murmurs, rubs, or gallops. Left chest incision C/D/I without erythema, ecchymosis or hematoma noted  PULMONARY: Clear to auscultation and perfusion.  No rales, wheezing, or rhonchi bilaterally.  ABDOMEN: Soft, Nontender, Nondistended; Bowel sounds present  EXTREMITIES:  2+ Peripheral Pulses, No clubbing, cyanosis, or edema  LYMPH: No lymphadenopathy noted  NEUROLOGICAL: Grossly nonfocal    Labs:       HPI: 83 year old male with history of HTN. HLD, Afib on Coumadin,  RACHEL with BIpap, ventricular tachycardia who presents for AICD.  Patient s/p single chamber AICD POD#1.     5/21/24: Patient s/p single chamber ICD implant, ILR explant POD#1.  Denies any CP, palpitations, SOB, dizziness, lightheadedness, significant pain at site.  TELE: SB, v pacing 40s  EKG: AF with slow ventricular response@46bpm  QRS: 176ms  QT/QTc:544/476ms    MEDICATIONS  (STANDING):  atorvastatin 10 milliGRAM(s) Oral at bedtime  finasteride 5 milliGRAM(s) Oral daily  furosemide    Tablet 40 milliGRAM(s) Oral daily  metoprolol succinate ER 12.5 milliGRAM(s) Oral daily  montelukast 10 milliGRAM(s) Oral daily  potassium chloride    Tablet ER 40 milliEquivalent(s) Oral daily  tamsulosin 0.8 milliGRAM(s) Oral at bedtime    MEDICATIONS  (PRN):  acetaminophen     Tablet .. 650 milliGRAM(s) Oral every 6 hours PRN Mild Pain (1 - 3), Moderate Pain (4 - 6)      Vital Signs Last 24 Hrs  T(C): 36.4 (21 May 2024 08:27), Max: 36.9 (21 May 2024 05:47)  T(F): 97.5 (21 May 2024 08:27), Max: 98.5 (21 May 2024 05:47)  HR: 40 (21 May 2024 08:27) (40 - 45)  BP: 157/64 (21 May 2024 08:27) (122/82 - 167/77)  BP(mean): 98 (21 May 2024 05:47) (98 - 98)  RR: 18 (21 May 2024 08:27) (16 - 18)  SpO2: 98% (21 May 2024 08:27) (97% - 100%)    Parameters below as of 21 May 2024 08:27  Patient On (Oxygen Delivery Method): room air        PHYSICAL EXAMINATION:  GENERAL: In no apparent distress  HEART: Regular rate and rhythm; No murmurs, rubs, or gallops. Left chest incision C/D/I without ecchymosis or hematoma noted. Mild bruising at site.  Patient with skin abrasion at site of sterile drape.  PULMONARY: Clear to auscultation and perfusion.  No rales, wheezing, or rhonchi bilaterally.  ABDOMEN: Soft, Nontender, Nondistended; Bowel sounds present  EXTREMITIES:  2+ Peripheral Pulses, No clubbing, cyanosis, or edema  LYMPH: No lymphadenopathy noted  NEUROLOGICAL: Grossly nonfocal    Labs:  05-21    144  |  112<H>  |  24<H>  ----------------------------<  100<H>  3.8   |  27  |  1.14    Ca    9.0      21 May 2024 07:51                          12.6   6.34  )-----------( 125      ( 21 May 2024 07:51 )             40.4       Radiology:  IMPRESSION:  No radiographic evidence of active chest disease..  Cardiac device wire lead is within right ventricle.    --- Endof Report ---    LAURA COSTA MD; Attending Radiologist  This document has been electronically signed. May 21 2024  9:28AM         HPI: 83 year old male with history of HTN. HLD, Afib on Coumadin,  RACHEL with BIpap, ventricular tachycardia who presents for AICD.  Patient s/p single chamber AICD POD#1.     5/21/24: Patient s/p single chamber ICD implant, ILR explant POD#1.  Denies any CP, palpitations, SOB, dizziness, lightheadedness, significant pain at site.  TELE: SB, v pacing 40s  EKG: AF with slow ventricular response@46bpm  QRS: 176ms  QT/QTc:544/476ms    MEDICATIONS  (STANDING):  atorvastatin 10 milliGRAM(s) Oral at bedtime  finasteride 5 milliGRAM(s) Oral daily  furosemide    Tablet 40 milliGRAM(s) Oral daily  metoprolol succinate ER 12.5 milliGRAM(s) Oral daily  montelukast 10 milliGRAM(s) Oral daily  potassium chloride    Tablet ER 40 milliEquivalent(s) Oral daily  tamsulosin 0.8 milliGRAM(s) Oral at bedtime    MEDICATIONS  (PRN):  acetaminophen     Tablet .. 650 milliGRAM(s) Oral every 6 hours PRN Mild Pain (1 - 3), Moderate Pain (4 - 6)      Vital Signs Last 24 Hrs  T(C): 36.4 (21 May 2024 08:27), Max: 36.9 (21 May 2024 05:47)  T(F): 97.5 (21 May 2024 08:27), Max: 98.5 (21 May 2024 05:47)  HR: 40 (21 May 2024 08:27) (40 - 45)  BP: 157/64 (21 May 2024 08:27) (122/82 - 167/77)  BP(mean): 98 (21 May 2024 05:47) (98 - 98)  RR: 18 (21 May 2024 08:27) (16 - 18)  SpO2: 98% (21 May 2024 08:27) (97% - 100%)    Parameters below as of 21 May 2024 08:27  Patient On (Oxygen Delivery Method): room air        PHYSICAL EXAMINATION:  GENERAL: In no apparent distress  HEART: Regular rate and rhythm; No murmurs, rubs, or gallops. Left chest incision C/D/I without ecchymosis or hematoma noted. Mild bruising at site.  Patient with skin abrasion at site of sterile drape.  PULMONARY: Clear to auscultation and perfusion.  No rales, wheezing, or rhonchi bilaterally.  ABDOMEN: Soft, Nontender, Nondistended; Bowel sounds present  EXTREMITIES:  2+ Peripheral Pulses, No clubbing, cyanosis, or edema  LYMPH: No lymphadenopathy noted  NEUROLOGICAL: Grossly nonfocal    Labs:  05-21    144  |  112<H>  |  24<H>  ----------------------------<  100<H>  3.8   |  27  |  1.14    Ca    9.0      21 May 2024 07:51                          12.6   6.34  )-----------( 125      ( 21 May 2024 07:51 )             40.4    Prothrombin Time and INR, Plasma (05.21.24 @ 11:07)    Prothrombin Time, Plasma: 13.3 sec   INR: 1.18    Radiology:  IMPRESSION:  No radiographic evidence of active chest disease..  Cardiac device wire lead is within right ventricle.    --- Endof Report ---    LAURA COSTA MD; Attending Radiologist  This document has been electronically signed. May 21 2024  9:28AM

## 2024-05-21 NOTE — DISCHARGE NOTE NURSING/CASE MANAGEMENT/SOCIAL WORK - NSDCPEPTCOWAR_GEN_ALL_CORE
Warfarin/Coumadin - Compliance/Warfarin/Coumadin - Dietary Advice/Warfarin/Coumadin - Follow up monitoring/Warfarin/Coumadin - Potential for adverse drug reactions and interactions
08-10    137  |  105  |  10  ----------------------------<  100<H>  4.8   |  18<L>  |  0.49<L>    Ca    9.1      10 Aug 2023 08:05  Mg     2.60     08-08    TPro  6.4  /  Alb  3.0<L>  /  TBili  0.4  /  DBili  x   /  AST  35<H>  /  ALT  31  /  AlkPhos  109  08-10

## 2024-05-21 NOTE — DISCHARGE NOTE PROVIDER - CARE PROVIDER_API CALL
Gisell David  Interventional Cardiology  [address] [place] [address]  Phone: [phone]  Fax: [phone]  Follow Up Time:

## 2024-05-21 NOTE — DISCHARGE NOTE PROVIDER - HOSPITAL COURSE
HPI: 83 year old male with history of HTN. HLD, Afib on Coumadin,  RACHEL with BIpap, ventricular tachycardia who presents for AICD.  Patient s/p single chamber AICD POD#1.     5/21/24: Patient s/p single chamber ICD implant, ILR explant POD#1.  Denies any CP, palpitations, SOB, dizziness, lightheadedness, significant pain at site.  TELE: SB, v pacing 40s  EKG: AF with slow ventricular response@46bpm  QRS: 176ms  QT/QTc:544/476ms    MEDICATIONS  (STANDING):  atorvastatin 10 milliGRAM(s) Oral at bedtime  finasteride 5 milliGRAM(s) Oral daily  furosemide    Tablet 40 milliGRAM(s) Oral daily  metoprolol succinate ER 12.5 milliGRAM(s) Oral daily  montelukast 10 milliGRAM(s) Oral daily  potassium chloride    Tablet ER 40 milliEquivalent(s) Oral daily  tamsulosin 0.8 milliGRAM(s) Oral at bedtime  MEDICATIONS  (PRN):  acetaminophen     Tablet .. 650 milliGRAM(s) Oral every 6 hours PRN Mild Pain (1 - 3), Moderate Pain (4 - 6)  Vital Signs Last 24 Hrs  T(C): 36.4 (21 May 2024 08:27), Max: 36.9 (21 May 2024 05:47)  T(F): 97.5 (21 May 2024 08:27), Max: 98.5 (21 May 2024 05:47)  HR: 40 (21 May 2024 08:27) (40 - 45)  BP: 157/64 (21 May 2024 08:27) (122/82 - 167/77)  BP(mean): 98 (21 May 2024 05:47) (98 - 98)  RR: 18 (21 May 2024 08:27) (16 - 18)  SpO2: 98% (21 May 2024 08:27) (97% - 100%)  Parameters below as of 21 May 2024 08:27  Patient On (Oxygen Delivery Method): room air  PHYSICAL EXAMINATION:  GENERAL: In no apparent distress  HEART: Regular rate and rhythm; No murmurs, rubs, or gallops. Left chest incision C/D/I without ecchymosis or hematoma noted. Mild bruising at site.  Patient with skin abrasion at site of sterile drape.  PULMONARY: Clear to auscultation and perfusion.  No rales, wheezing, or rhonchi bilaterally.  ABDOMEN: Soft, Nontender, Nondistended; Bowel sounds present  EXTREMITIES:  2+ Peripheral Pulses, No clubbing, cyanosis, or edema  LYMPH: No lymphadenopathy noted  NEUROLOGICAL: Grossly nonfocal    Labs:  05-21    144  |  112<H>  |  24<H>  ----------------------------<  100<H>  3.8   |  27  |  1.14    Ca    9.0      21 May 2024 07:51                        12.6   6.34  )-----------( 125      ( 21 May 2024 07:51 )             40.4   Radiology:  IMPRESSION:  No radiographic evidence of active chest disease..  Cardiac device wire lead is within right ventricle.    --- Endof Report ---    LAURA COSTA MD; Attending Radiologist  This document has been electronically signed. May 21 2024  9:28AM  ASSESSMENT AND PLAN  83yMale POD#1, Status Post ICD implant, ILR explant.    Device interrogated this AM with normal function. V pacing 45%  Metoprolol decreased to 12.5mg PO daily due to bradycardia  Resume Coumadin tonight  Patient with abrasion to chest due to sterile drape, apply mupirocin BID  Post operative instructions reviewed with verbalized understanding, patient is aware of left arm and driving precautions  Outpatient EP follow up in two weeks or sooner for symptoms or concerns  Stable for discharge from EP standpoint  Plan discussed with patient, RN and Dr. Alanis              HPI: 83 year old male with history of HTN. HLD, Afib on Coumadin,  RACHEL with BIpap, ventricular tachycardia who presents for AICD.  Patient s/p single chamber AICD POD#1.     5/21/24: Patient s/p single chamber ICD implant, ILR explant POD#1.  Denies any CP, palpitations, SOB, dizziness, lightheadedness, significant pain at site.  TELE: SB, v pacing 40s  EKG: AF with slow ventricular response@46bpm  QRS: 176ms  QT/QTc:544/476ms    MEDICATIONS  (STANDING):  atorvastatin 10 milliGRAM(s) Oral at bedtime  finasteride 5 milliGRAM(s) Oral daily  furosemide    Tablet 40 milliGRAM(s) Oral daily  metoprolol succinate ER 12.5 milliGRAM(s) Oral daily  montelukast 10 milliGRAM(s) Oral daily  potassium chloride    Tablet ER 40 milliEquivalent(s) Oral daily  tamsulosin 0.8 milliGRAM(s) Oral at bedtime  MEDICATIONS  (PRN):  acetaminophen     Tablet .. 650 milliGRAM(s) Oral every 6 hours PRN Mild Pain (1 - 3), Moderate Pain (4 - 6)  Vital Signs Last 24 Hrs  T(C): 36.4 (21 May 2024 08:27), Max: 36.9 (21 May 2024 05:47)  T(F): 97.5 (21 May 2024 08:27), Max: 98.5 (21 May 2024 05:47)  HR: 40 (21 May 2024 08:27) (40 - 45)  BP: 157/64 (21 May 2024 08:27) (122/82 - 167/77)  BP(mean): 98 (21 May 2024 05:47) (98 - 98)  RR: 18 (21 May 2024 08:27) (16 - 18)  SpO2: 98% (21 May 2024 08:27) (97% - 100%)  Parameters below as of 21 May 2024 08:27  Patient On (Oxygen Delivery Method): room air  PHYSICAL EXAMINATION:  GENERAL: In no apparent distress  HEART: Regular rate and rhythm; No murmurs, rubs, or gallops. Left chest incision C/D/I without ecchymosis or hematoma noted. Mild bruising at site.  Patient with skin abrasion at site of sterile drape.  PULMONARY: Clear to auscultation and perfusion.  No rales, wheezing, or rhonchi bilaterally.  ABDOMEN: Soft, Nontender, Nondistended; Bowel sounds present  EXTREMITIES:  2+ Peripheral Pulses, No clubbing, cyanosis, or edema  LYMPH: No lymphadenopathy noted  NEUROLOGICAL: Grossly nonfocal    Labs:  05-21    144  |  112<H>  |  24<H>  ----------------------------<  100<H>  3.8   |  27  |  1.14    Ca    9.0      21 May 2024 07:51                        12.6   6.34  )-----------( 125      ( 21 May 2024 07:51 )             40.4   Prothrombin Time and INR, Plasma (05.21.24 @ 11:07)    Prothrombin Time, Plasma: 13.3 sec   INR: 1.18    Radiology:  IMPRESSION:  No radiographic evidence of active chest disease..  Cardiac device wire lead is within right ventricle.    --- Endof Report ---    LARUA COSTA MD; Attending Radiologist  This document has been electronically signed. May 21 2024  9:28AM  ASSESSMENT AND PLAN  83yMale POD#1, Status Post ICD implant, ILR explant.    Device interrogated this AM with normal function. V pacing 45%  Metoprolol decreased to 12.5mg PO daily due to bradycardia  Resume Coumadin tonight  Patient with abrasion to chest due to sterile drape, apply mupirocin BID  Post operative instructions reviewed with verbalized understanding, patient is aware of left arm and driving precautions  Outpatient EP follow up in two weeks or sooner for symptoms or concerns  Stable for discharge from EP standpoint  Plan discussed with patient, RN and Dr. Alanis

## 2024-05-21 NOTE — DISCHARGE NOTE NURSING/CASE MANAGEMENT/SOCIAL WORK - PATIENT PORTAL LINK FT
You can access the FollowMyHealth Patient Portal offered by [facility] by registering at the following website: [email or web address]. By joining [employer]’s FollowMyHealth portal, you will also be able to view your health information using other applications (apps) compatible with our system.

## 2024-05-21 NOTE — DISCHARGE NOTE NURSING/CASE MANAGEMENT/SOCIAL WORK - NSTOBACCONEVERSMOKERY/N_GEN_A
PAST SURGICAL HISTORY:  Facial laceration     H/O cardiac catheterization     History of loop recorder        No

## 2024-05-21 NOTE — DISCHARGE NOTE PROVIDER - NSDCMRMEDTOKEN_GEN_ALL_CORE_FT
acetaminophen 325 mg oral tablet: 2 tab(s) orally every 6 hours As needed Mild Pain (1 - 3), Moderate Pain (4 - 6)  Avodart 0.5 mg oral capsule: 1 cap(s) orally  Klor-Con M20 oral tablet, extended release: 2 tab(s) orally once a day  Lasix 40 mg oral tablet: 1 tab(s) orally once a day  mupirocin 2% topical ointment: 1 Apply topically to affected area 2 times a day  pravastatin 40 mg oral tablet: 1 tab(s) orally once a day  Singulair 10 mg oral tablet: 1 tab(s) orally once a day  tamsulosin 0.4 mg oral capsule: 2 cap(s) orally once a day (at bedtime)  Toprol-XL 25 mg oral tablet, extended release: 0.5 tab(s) orally once a day  Uroxatral 10 mg oral tablet, extended release: 1 tab(s) orally once a day  warfarin 5 mg oral tablet: 1 tab(s) orally once a day for 4 days (Tues, Thurs, Sat, Sun)  warfarin 7.5 mg oral tablet: 1 tab(s) orally once a day Mon, Wed, Fri

## 2024-05-21 NOTE — DISCHARGE NOTE PROVIDER - NSDCCPTREATMENT_GEN_ALL_CORE_FT
PRINCIPAL PROCEDURE  Procedure: Implantation, ICD  Findings and Treatment:       SECONDARY PROCEDURE  Procedure: Removal, loop recorder  Findings and Treatment:

## 2024-05-21 NOTE — PROGRESS NOTE ADULT - ASSESSMENT
ASSESSMENT AND PLAN  83yMale POD#1, Status Post               Patient denies any complaints of chest pain, SOB, dizziness, palpitations or any significant discomfort.  Stable for Discharge from EP standpoint.  Device interrogated, normal function.  Postop instructions provided and patient understood.  Patient to follow up in EP clinic in 2 weeks, or sooner with questions and concerns.                   ASSESSMENT AND PLAN  83yMale POD#1, Status Post ICD implant, ILR explant.    Device interrogated this AM with normal function. V pacing 45%  Metoprolol decreased to 12.5mg PO daily due to bradycardia  Resume Coumadin tonight  Patient with abrasion to chest due to sterile drape, apply mupirocin daily  Post operative instructions reviewed with verbalized understanding, patient is aware of left arm and driving precautions  Outpatient EP follow up in two weeks or sooner for symptoms or concerns  Stable for discharge from EP standpoint  Plan discussed with patient, RN and Dr. Alanis      ASSESSMENT AND PLAN  83yMale POD#1, Status Post ICD implant, ILR explant.    Device interrogated this AM with normal function. V pacing 45%  Metoprolol decreased to 12.5mg PO daily due to bradycardia  Resume Coumadin tonight  Patient with abrasion to chest due to sterile drape, apply mupirocin BID  Post operative instructions reviewed with verbalized understanding, patient is aware of left arm and driving precautions  Outpatient EP follow up in two weeks or sooner for symptoms or concerns  Stable for discharge from EP standpoint  Plan discussed with patient, RN and Dr. Alanis

## 2024-05-21 NOTE — DISCHARGE NOTE PROVIDER - NSDCCPCAREPLAN_GEN_ALL_CORE_FT
PRINCIPAL DISCHARGE DIAGNOSIS  Diagnosis: H/O ventricular tachycardia  Assessment and Plan of Treatment:

## 2024-05-22 DIAGNOSIS — I47.29 OTHER VENTRICULAR TACHYCARDIA: ICD-10-CM

## 2024-05-22 DIAGNOSIS — I48.20 CHRONIC ATRIAL FIBRILLATION, UNSPECIFIED: ICD-10-CM

## 2024-05-27 PROBLEM — R97.20 ELEVATED PSA: Status: ACTIVE | Noted: 2024-05-27

## 2024-05-27 NOTE — PHYSICAL EXAM
[Urethral Meatus] : meatus normal [Penis Abnormality] : normal circumcised penis [Scrotum] : the scrotum was normal [Epididymis] : the epididymides were normal [Testes Tenderness] : no tenderness of the testes [Testes Mass (___cm)] : there were no testicular masses [Normal Appearance] : normal appearance [Well Groomed] : well groomed [General Appearance - In No Acute Distress] : no acute distress [Edema] : no peripheral edema [Respiration, Rhythm And Depth] : normal respiratory rhythm and effort Detail Level: Simple [Exaggerated Use Of Accessory Muscles For Inspiration] : no accessory muscle use Render Risk Assessment In Note?: yes [Abdomen Soft] : soft Additional Notes: Patient is s/p WLE with Dr. 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  Margins from re-excision are clear.  She had her sutures removed today.  Wound with minor erythema, patient states pain is stable, no drainage.  Recommended continue mupirocin provided by plastics bid x 7 days.  Sent over cefadroxil - patient instructed to take if she notes increased pain or erythema. [Abdomen Tenderness] : non-tender [Costovertebral Angle Tenderness] : no ~M costovertebral angle tenderness [Urinary Bladder Findings] : the bladder was normal on palpation [Normal Station and Gait] : the gait and station were normal for the patient's age [] : no rash [No Focal Deficits] : no focal deficits [Oriented To Time, Place, And Person] : oriented to person, place, and time [Affect] : the affect was normal [Mood] : the mood was normal [No Palpable Adenopathy] : no palpable adenopathy [FreeTextEntry1] : normal peripheral circulation

## 2024-05-27 NOTE — ASSESSMENT
[FreeTextEntry1] : Reviewed records. Discussed labs and imaging.   Renal cyst: Again, discussed that Renal cysts are a common finding on routine radiological studies. Autopsy studies in patients over the age of 50 reveal greater than a 50% chance of having at least one simple renal cyst. And that renal cysts may be classified as simple or complex depending how they look on imaging. Discussed complexity of renal cysts and its implications as regards malignancy.    Benign Prostatic Hyperplasia: Discussed treatment options, will continue Alfuzosin and Avodart.  Elevated PSA: Discussed considering Patient on Avodart, corrected PSA: 4.06 Discussed PSA screening and latest recommendations/guidelines- USPTF and AUA.  Recommended to stop PSA checks if not interested in further work up.   Fatigue: Erectile Dysfunction: Will get Total and Free Testosterone.   Will inform results.  Return to office in 3 months or sooner if any issues.

## 2024-05-27 NOTE — HISTORY OF PRESENT ILLNESS
[FreeTextEntry1] : 05/15/2024: 83-year-old male presents for follow up.  Repeat PSA 2.03 with adjustment for Avodart 4.06.  No changes in urination. Still variable stream, no nocturia, and after Lasix, daily urinates frequently requiring pads.  Continues Avodart and Alfuzosin.  Patient reports he has multiple comorbidities and is having a pacemaker/ICD insertion in the next 2 weeks. He also endorses fatigue, muscle weakness, and difficulty attaining and maintaining erections.  Patient brought in report of prior ultrasound that showed left renal cyst.   Seen on 02/10/23 for Elevated PSA Denied any recent unintentional weight loss, night sweats and new bone or back pain. Family history of Prostate cancer: Brother.  Lost 80 lbs last year and gained 30 back.   Same urination.   Seen on 12/22/22 for history of Benign Prostatic Hyperplasia.  Saw Dr Garcia and Rosita in the past.  On Alfuzosin and Avodart.  On Lasix, takes at 1.30p, urinates frequently requiring pads, changes 3 pads. Starts around 4p to 9p. No nocturia.  Urinates few times before Lasix. Sometimes stands and sometimes sits to urinate.  Had variable stream, occasional sense of incomplete emptying. Denied hesitancy, straining. Denied dysuria, hematuria, lower abdominal or flank pain, fever, chills or rigors. Not sexually active. Has Erectile dysfunction.  Family history of Prostate cancer: Brother.  History of kidney stone.

## 2024-05-28 DIAGNOSIS — I47.20 VENTRICULAR TACHYCARDIA, UNSPECIFIED: ICD-10-CM

## 2024-05-31 ENCOUNTER — RX RENEWAL (OUTPATIENT)
Age: 84
End: 2024-05-31

## 2024-05-31 ENCOUNTER — APPOINTMENT (OUTPATIENT)
Dept: ELECTROPHYSIOLOGY | Facility: CLINIC | Age: 84
End: 2024-05-31

## 2024-05-31 RX ORDER — PRAVASTATIN SODIUM 40 MG/1
40 TABLET ORAL
Qty: 90 | Refills: 1 | Status: ACTIVE | COMMUNITY
Start: 2018-02-20 | End: 1900-01-01

## 2024-06-10 ENCOUNTER — APPOINTMENT (OUTPATIENT)
Dept: INTERNAL MEDICINE | Facility: CLINIC | Age: 84
End: 2024-06-10
Payer: MEDICARE

## 2024-06-10 ENCOUNTER — NON-APPOINTMENT (OUTPATIENT)
Age: 84
End: 2024-06-10

## 2024-06-10 VITALS
WEIGHT: 306 LBS | HEART RATE: 57 BPM | SYSTOLIC BLOOD PRESSURE: 120 MMHG | OXYGEN SATURATION: 96 % | DIASTOLIC BLOOD PRESSURE: 80 MMHG | HEIGHT: 69 IN | TEMPERATURE: 97.5 F | RESPIRATION RATE: 16 BRPM | BODY MASS INDEX: 45.32 KG/M2

## 2024-06-10 DIAGNOSIS — J45.909 UNSPECIFIED ASTHMA, UNCOMPLICATED: ICD-10-CM

## 2024-06-10 DIAGNOSIS — R06.09 OTHER FORMS OF DYSPNEA: ICD-10-CM

## 2024-06-10 DIAGNOSIS — I48.91 UNSPECIFIED ATRIAL FIBRILLATION: ICD-10-CM

## 2024-06-10 LAB
INR PPP: 2.5 RATIO
POCT-PROTHROMBIN TIME: 29.7 SECS

## 2024-06-10 PROCEDURE — 85610 PROTHROMBIN TIME: CPT | Mod: QW

## 2024-06-10 PROCEDURE — 99214 OFFICE O/P EST MOD 30 MIN: CPT

## 2024-06-10 RX ORDER — ALBUTEROL SULFATE 90 UG/1
108 (90 BASE) INHALANT RESPIRATORY (INHALATION)
Qty: 1 | Refills: 3 | Status: ACTIVE | COMMUNITY
Start: 2024-06-07 | End: 1900-01-01

## 2024-06-10 RX ORDER — PREDNISONE 20 MG/1
20 TABLET ORAL
Qty: 12.5 | Refills: 1 | Status: ACTIVE | COMMUNITY
Start: 2024-06-10 | End: 1900-01-01

## 2024-06-10 RX ORDER — LEVALBUTEROL TARTRATE 45 UG/1
45 AEROSOL, METERED ORAL
Qty: 1 | Refills: 3 | Status: ACTIVE | COMMUNITY
Start: 2020-05-06 | End: 1900-01-01

## 2024-06-11 LAB
ALBUMIN SERPL ELPH-MCNC: 4.4 G/DL
ALP BLD-CCNC: 92 U/L
ALT SERPL-CCNC: 16 U/L
ANION GAP SERPL CALC-SCNC: 14 MMOL/L
AST SERPL-CCNC: 25 U/L
BASOPHILS # BLD AUTO: 0.06 K/UL
BASOPHILS NFR BLD AUTO: 0.9 %
BILIRUB SERPL-MCNC: 0.8 MG/DL
BUN SERPL-MCNC: 20 MG/DL
CALCIUM SERPL-MCNC: 9.2 MG/DL
CHLORIDE SERPL-SCNC: 105 MMOL/L
CO2 SERPL-SCNC: 24 MMOL/L
CREAT SERPL-MCNC: 1.01 MG/DL
DEPRECATED D DIMER PPP IA-ACNC: 272 NG/ML DDU
EGFR: 74 ML/MIN/1.73M2
EOSINOPHIL # BLD AUTO: 0.24 K/UL
EOSINOPHIL NFR BLD AUTO: 3.4 %
GLUCOSE SERPL-MCNC: 87 MG/DL
HCT VFR BLD CALC: 43 %
HGB BLD-MCNC: 13.3 G/DL
IMM GRANULOCYTES NFR BLD AUTO: 0.3 %
LYMPHOCYTES # BLD AUTO: 1.1 K/UL
LYMPHOCYTES NFR BLD AUTO: 15.6 %
MAN DIFF?: NORMAL
MCHC RBC-ENTMCNC: 29.1 PG
MCHC RBC-ENTMCNC: 30.9 GM/DL
MCV RBC AUTO: 94.1 FL
MONOCYTES # BLD AUTO: 0.38 K/UL
MONOCYTES NFR BLD AUTO: 5.4 %
NEUTROPHILS # BLD AUTO: 5.23 K/UL
NEUTROPHILS NFR BLD AUTO: 74.4 %
NT-PROBNP SERPL-MCNC: 814 PG/ML
PLATELET # BLD AUTO: 146 K/UL
POTASSIUM SERPL-SCNC: 4.4 MMOL/L
PROT SERPL-MCNC: 6.2 G/DL
RBC # BLD: 4.57 M/UL
RBC # FLD: 13.3 %
SODIUM SERPL-SCNC: 144 MMOL/L
WBC # FLD AUTO: 7.03 K/UL

## 2024-06-17 ENCOUNTER — NON-APPOINTMENT (OUTPATIENT)
Age: 84
End: 2024-06-17

## 2024-06-17 ENCOUNTER — APPOINTMENT (OUTPATIENT)
Dept: ELECTROPHYSIOLOGY | Facility: CLINIC | Age: 84
End: 2024-06-17
Payer: MEDICARE

## 2024-06-17 VITALS
WEIGHT: 306 LBS | HEIGHT: 69 IN | HEART RATE: 50 BPM | BODY MASS INDEX: 45.32 KG/M2 | SYSTOLIC BLOOD PRESSURE: 152 MMHG | OXYGEN SATURATION: 98 % | DIASTOLIC BLOOD PRESSURE: 67 MMHG

## 2024-06-17 PROCEDURE — 93282 PRGRMG EVAL IMPLANTABLE DFB: CPT

## 2024-07-02 ENCOUNTER — APPOINTMENT (OUTPATIENT)
Dept: INTERNAL MEDICINE | Facility: CLINIC | Age: 84
End: 2024-07-02
Payer: MEDICARE

## 2024-07-02 ENCOUNTER — NON-APPOINTMENT (OUTPATIENT)
Age: 84
End: 2024-07-02

## 2024-07-02 ENCOUNTER — RESULT CHARGE (OUTPATIENT)
Age: 84
End: 2024-07-02

## 2024-07-02 LAB
INR PPP: 2.8 RATIO
POCT-PROTHROMBIN TIME: 33.1 SECS

## 2024-07-02 PROCEDURE — 85610 PROTHROMBIN TIME: CPT | Mod: QW

## 2024-07-12 ENCOUNTER — APPOINTMENT (OUTPATIENT)
Dept: INTERNAL MEDICINE | Facility: CLINIC | Age: 84
End: 2024-07-12
Payer: MEDICARE

## 2024-07-12 VITALS
TEMPERATURE: 97.8 F | BODY MASS INDEX: 45.32 KG/M2 | RESPIRATION RATE: 16 BRPM | DIASTOLIC BLOOD PRESSURE: 62 MMHG | WEIGHT: 306 LBS | OXYGEN SATURATION: 96 % | HEIGHT: 69 IN | SYSTOLIC BLOOD PRESSURE: 126 MMHG | HEART RATE: 51 BPM

## 2024-07-12 DIAGNOSIS — I48.91 UNSPECIFIED ATRIAL FIBRILLATION: ICD-10-CM

## 2024-07-12 DIAGNOSIS — F17.200 NICOTINE DEPENDENCE, UNSPECIFIED, UNCOMPLICATED: ICD-10-CM

## 2024-07-12 DIAGNOSIS — I10 ESSENTIAL (PRIMARY) HYPERTENSION: ICD-10-CM

## 2024-07-12 DIAGNOSIS — Z95.810 PRESENCE OF AUTOMATIC (IMPLANTABLE) CARDIAC DEFIBRILLATOR: ICD-10-CM

## 2024-07-12 DIAGNOSIS — I26.99 OTHER PULMONARY EMBOLISM W/OUT ACUTE COR PULMONALE: ICD-10-CM

## 2024-07-12 DIAGNOSIS — D69.6 THROMBOCYTOPENIA, UNSPECIFIED: ICD-10-CM

## 2024-07-12 DIAGNOSIS — Z95.0 PRESENCE OF CARDIAC PACEMAKER: ICD-10-CM

## 2024-07-12 DIAGNOSIS — G47.33 OBSTRUCTIVE SLEEP APNEA (ADULT) (PEDIATRIC): ICD-10-CM

## 2024-07-12 DIAGNOSIS — I82.5Y9 CHRONIC EMBOLISM AND THROMBOSIS OF UNSPECIFIED DEEP VEINS OF UNSPECIFIED PROXIMAL LOWER EXTREMITY: ICD-10-CM

## 2024-07-12 DIAGNOSIS — I89.0 LYMPHEDEMA, NOT ELSEWHERE CLASSIFIED: ICD-10-CM

## 2024-07-12 DIAGNOSIS — R06.09 OTHER FORMS OF DYSPNEA: ICD-10-CM

## 2024-07-12 DIAGNOSIS — Z87.898 PERSONAL HISTORY OF OTHER SPECIFIED CONDITIONS: ICD-10-CM

## 2024-07-12 PROCEDURE — 99214 OFFICE O/P EST MOD 30 MIN: CPT

## 2024-07-12 RX ORDER — METOPROLOL SUCCINATE 50 MG/1
50 TABLET, EXTENDED RELEASE ORAL
Refills: 0 | Status: ACTIVE | COMMUNITY

## 2024-08-07 ENCOUNTER — APPOINTMENT (OUTPATIENT)
Dept: UROLOGY | Facility: CLINIC | Age: 84
End: 2024-08-07

## 2024-08-09 ENCOUNTER — APPOINTMENT (OUTPATIENT)
Dept: UROLOGY | Facility: CLINIC | Age: 84
End: 2024-08-09

## 2024-08-09 ENCOUNTER — RX RENEWAL (OUTPATIENT)
Age: 84
End: 2024-08-09

## 2024-08-09 PROCEDURE — 99214 OFFICE O/P EST MOD 30 MIN: CPT

## 2024-08-09 NOTE — ASSESSMENT
[FreeTextEntry1] : 08/09/2024: Discussed treatment options.  Patient will try Gemtesa samples. Will let us know if it helps. Will do prescription if it helps.  Will continue Alfuzosin and Avodart.   In the past, patient had agreed to stop checking PSA.    Reviewed records. Discussed labs and imaging.   Renal cyst: Again, discussed that Renal cysts are a common finding on routine radiological studies. Autopsy studies in patients over the age of 50 reveal greater than a 50% chance of having at least one simple renal cyst. And that renal cysts may be classified as simple or complex depending how they look on imaging. Discussed complexity of renal cysts and its implications as regards malignancy.    Benign Prostatic Hyperplasia: Discussed treatment options, will continue Alfuzosin and Avodart.  Elevated PSA: Discussed considering Patient on Avodart, corrected PSA: 4.06 Discussed PSA screening and latest recommendations/guidelines- USPTF and AUA.  Recommended to stop PSA checks if not interested in further work up.   Fatigue: Erectile Dysfunction: Will get Total and Free Testosterone.   Will inform results.   Return to office in 6 months or sooner if there are any issues. Will do uroflow and check PVR.

## 2024-08-09 NOTE — HISTORY OF PRESENT ILLNESS
[FreeTextEntry1] : 08/09/2024: 83-year-old male presents for follow-up.  Taking Alfuzosin and Avodart. Has variable stream, daytime frequency 4-5 times and nocturia 1 time.  Has diuretic-related frequency and urgency.  No other bother with urination.  Did not try Gemtesa samples.  05/15/2024: 83-year-old male presents for follow up.  Repeat PSA 2.03 with adjustment for Avodart 4.06.  No changes in urination. Still variable stream, no nocturia, and after Lasix, daily urinates frequently requiring pads.  Continues Avodart and Alfuzosin.  Patient reports he has multiple comorbidities and is having a pacemaker/ICD insertion in the next 2 weeks. He also endorses fatigue, muscle weakness, and difficulty attaining and maintaining erections.  Patient brought in report of prior ultrasound that showed left renal cyst.   Seen on 02/10/23 for Elevated PSA Denied any recent unintentional weight loss, night sweats and new bone or back pain. Family history of Prostate cancer: Brother.  Lost 80 lbs last year and gained 30 back.   Same urination.   Seen on 12/22/22 for history of Benign Prostatic Hyperplasia.  Saw Dr Garcia and Rosiat in the past.  On Alfuzosin and Avodart.  On Lasix, takes at 1.30p, urinates frequently requiring pads, changes 3 pads. Starts around 4p to 9p. No nocturia.  Urinates few times before Lasix. Sometimes stands and sometimes sits to urinate.  Had variable stream, occasional sense of incomplete emptying. Denied hesitancy, straining. Denied dysuria, hematuria, lower abdominal or flank pain, fever, chills or rigors. Not sexually active. Has Erectile dysfunction.  Family history of Prostate cancer: Brother.  History of kidney stone.

## 2024-08-09 NOTE — ADDENDUM
[FreeTextEntry1] :  Suad BLANCHARD assisted in documentation on 08/09/2024  acting as a scribe for Dr. Morales Medellin.

## 2024-08-12 ENCOUNTER — RX RENEWAL (OUTPATIENT)
Age: 84
End: 2024-08-12

## 2024-08-25 PROBLEM — R97.20 ELEVATED PSA: Status: ACTIVE | Noted: 2024-08-25

## 2024-08-25 PROBLEM — R39.15 URINARY URGENCY: Status: ACTIVE | Noted: 2024-08-25

## 2024-09-30 ENCOUNTER — LABORATORY RESULT (OUTPATIENT)
Age: 84
End: 2024-09-30

## 2024-10-01 ENCOUNTER — LABORATORY RESULT (OUTPATIENT)
Age: 84
End: 2024-10-01

## 2024-10-02 ENCOUNTER — LABORATORY RESULT (OUTPATIENT)
Age: 84
End: 2024-10-02

## 2024-10-04 ENCOUNTER — LABORATORY RESULT (OUTPATIENT)
Age: 84
End: 2024-10-04

## 2024-10-07 ENCOUNTER — NON-APPOINTMENT (OUTPATIENT)
Age: 84
End: 2024-10-07

## 2024-10-07 ENCOUNTER — RX RENEWAL (OUTPATIENT)
Age: 84
End: 2024-10-07

## 2024-10-22 LAB — NONINV COLON CA DNA+OCC BLD SCRN STL QL: POSITIVE

## 2024-11-06 ENCOUNTER — NON-APPOINTMENT (OUTPATIENT)
Age: 84
End: 2024-11-06

## 2024-11-15 ENCOUNTER — APPOINTMENT (OUTPATIENT)
Dept: UROLOGY | Facility: CLINIC | Age: 84
End: 2024-11-15

## 2024-12-04 ENCOUNTER — NON-APPOINTMENT (OUTPATIENT)
Age: 84
End: 2024-12-04

## 2024-12-09 ENCOUNTER — RX RENEWAL (OUTPATIENT)
Age: 84
End: 2024-12-09

## 2024-12-16 ENCOUNTER — RX RENEWAL (OUTPATIENT)
Age: 84
End: 2024-12-16

## 2025-01-06 ENCOUNTER — LABORATORY RESULT (OUTPATIENT)
Age: 85
End: 2025-01-06

## 2025-02-03 ENCOUNTER — LABORATORY RESULT (OUTPATIENT)
Age: 85
End: 2025-02-03

## 2025-02-06 ENCOUNTER — RX RENEWAL (OUTPATIENT)
Age: 85
End: 2025-02-06

## 2025-02-10 ENCOUNTER — RX RENEWAL (OUTPATIENT)
Age: 85
End: 2025-02-10

## 2025-02-10 DIAGNOSIS — I48.91 UNSPECIFIED ATRIAL FIBRILLATION: ICD-10-CM

## 2025-02-11 ENCOUNTER — LABORATORY RESULT (OUTPATIENT)
Age: 85
End: 2025-02-11

## 2025-02-12 ENCOUNTER — APPOINTMENT (OUTPATIENT)
Dept: GASTROENTEROLOGY | Facility: CLINIC | Age: 85
End: 2025-02-12

## 2025-02-14 ENCOUNTER — APPOINTMENT (OUTPATIENT)
Dept: UROLOGY | Facility: CLINIC | Age: 85
End: 2025-02-14

## 2025-02-28 ENCOUNTER — RX RENEWAL (OUTPATIENT)
Age: 85
End: 2025-02-28

## 2025-03-03 ENCOUNTER — LABORATORY RESULT (OUTPATIENT)
Age: 85
End: 2025-03-03

## 2025-03-17 ENCOUNTER — RX RENEWAL (OUTPATIENT)
Age: 85
End: 2025-03-17

## 2025-03-17 ENCOUNTER — LABORATORY RESULT (OUTPATIENT)
Age: 85
End: 2025-03-17

## 2025-03-24 ENCOUNTER — APPOINTMENT (OUTPATIENT)
Dept: INTERNAL MEDICINE | Facility: CLINIC | Age: 85
End: 2025-03-24
Payer: MEDICARE

## 2025-03-24 VITALS
OXYGEN SATURATION: 99 % | HEIGHT: 69 IN | BODY MASS INDEX: 45.77 KG/M2 | DIASTOLIC BLOOD PRESSURE: 82 MMHG | TEMPERATURE: 97.5 F | WEIGHT: 309 LBS | HEART RATE: 50 BPM | SYSTOLIC BLOOD PRESSURE: 142 MMHG

## 2025-03-24 DIAGNOSIS — R79.89 OTHER SPECIFIED ABNORMAL FINDINGS OF BLOOD CHEMISTRY: ICD-10-CM

## 2025-03-24 DIAGNOSIS — I89.0 LYMPHEDEMA, NOT ELSEWHERE CLASSIFIED: ICD-10-CM

## 2025-03-24 DIAGNOSIS — Z95.810 PRESENCE OF AUTOMATIC (IMPLANTABLE) CARDIAC DEFIBRILLATOR: ICD-10-CM

## 2025-03-24 DIAGNOSIS — E78.00 PURE HYPERCHOLESTEROLEMIA, UNSPECIFIED: ICD-10-CM

## 2025-03-24 PROCEDURE — 99214 OFFICE O/P EST MOD 30 MIN: CPT

## 2025-04-07 ENCOUNTER — LABORATORY RESULT (OUTPATIENT)
Age: 85
End: 2025-04-07

## 2025-04-10 DIAGNOSIS — E03.9 HYPOTHYROIDISM, UNSPECIFIED: ICD-10-CM

## 2025-04-10 RX ORDER — LEVOTHYROXINE SODIUM 0.03 MG/1
25 TABLET ORAL
Qty: 90 | Refills: 0 | Status: ACTIVE | COMMUNITY
Start: 2025-04-10 | End: 1900-01-01

## 2025-05-05 ENCOUNTER — LABORATORY RESULT (OUTPATIENT)
Age: 85
End: 2025-05-05

## 2025-05-06 ENCOUNTER — NON-APPOINTMENT (OUTPATIENT)
Age: 85
End: 2025-05-06

## 2025-05-14 ENCOUNTER — RX RENEWAL (OUTPATIENT)
Age: 85
End: 2025-05-14

## 2025-06-02 ENCOUNTER — LABORATORY RESULT (OUTPATIENT)
Age: 85
End: 2025-06-02

## 2025-06-03 ENCOUNTER — NON-APPOINTMENT (OUTPATIENT)
Age: 85
End: 2025-06-03

## 2025-06-05 ENCOUNTER — RX RENEWAL (OUTPATIENT)
Age: 85
End: 2025-06-05

## 2025-06-20 ENCOUNTER — RX RENEWAL (OUTPATIENT)
Age: 85
End: 2025-06-20

## 2025-06-24 ENCOUNTER — RX RENEWAL (OUTPATIENT)
Age: 85
End: 2025-06-24

## 2025-07-07 ENCOUNTER — LABORATORY RESULT (OUTPATIENT)
Age: 85
End: 2025-07-07

## 2025-07-14 ENCOUNTER — LABORATORY RESULT (OUTPATIENT)
Age: 85
End: 2025-07-14

## 2025-08-04 ENCOUNTER — LABORATORY RESULT (OUTPATIENT)
Age: 85
End: 2025-08-04

## 2025-08-05 ENCOUNTER — LABORATORY RESULT (OUTPATIENT)
Age: 85
End: 2025-08-05

## 2025-08-11 ENCOUNTER — LABORATORY RESULT (OUTPATIENT)
Age: 85
End: 2025-08-11

## 2025-08-12 ENCOUNTER — LABORATORY RESULT (OUTPATIENT)
Age: 85
End: 2025-08-12

## 2025-08-13 ENCOUNTER — LABORATORY RESULT (OUTPATIENT)
Age: 85
End: 2025-08-13

## 2025-08-21 ENCOUNTER — RX RENEWAL (OUTPATIENT)
Age: 85
End: 2025-08-21

## 2025-08-29 ENCOUNTER — LABORATORY RESULT (OUTPATIENT)
Age: 85
End: 2025-08-29

## 2025-09-02 ENCOUNTER — NON-APPOINTMENT (OUTPATIENT)
Age: 85
End: 2025-09-02

## 2025-09-10 ENCOUNTER — APPOINTMENT (OUTPATIENT)
Dept: UROLOGY | Facility: CLINIC | Age: 85
End: 2025-09-10

## 2025-09-10 VITALS
OXYGEN SATURATION: 98 % | RESPIRATION RATE: 18 BRPM | HEIGHT: 69 IN | SYSTOLIC BLOOD PRESSURE: 144 MMHG | WEIGHT: 310 LBS | DIASTOLIC BLOOD PRESSURE: 88 MMHG | HEART RATE: 59 BPM | BODY MASS INDEX: 45.91 KG/M2

## 2025-09-10 DIAGNOSIS — N40.1 BENIGN PROSTATIC HYPERPLASIA WITH LOWER URINARY TRACT SYMPMS: ICD-10-CM

## 2025-09-10 DIAGNOSIS — R39.15 URGENCY OF URINATION: ICD-10-CM

## 2025-09-10 DIAGNOSIS — N13.8 BENIGN PROSTATIC HYPERPLASIA WITH LOWER URINARY TRACT SYMPMS: ICD-10-CM

## 2025-09-10 DIAGNOSIS — R32 UNSPECIFIED URINARY INCONTINENCE: ICD-10-CM

## 2025-09-19 ENCOUNTER — LABORATORY RESULT (OUTPATIENT)
Age: 85
End: 2025-09-19

## 2025-09-24 PROBLEM — Z87.2 HISTORY OF LENTIGO: Status: RESOLVED | Noted: 2022-03-29 | Resolved: 2025-09-24

## 2025-09-24 PROBLEM — M19.90 OSTEOARTHROPATHY: Status: RESOLVED | Noted: 2017-06-13 | Resolved: 2025-09-24

## 2025-09-24 PROBLEM — Z87.898 HISTORY OF VERTIGO: Status: RESOLVED | Noted: 2017-06-13 | Resolved: 2025-09-24

## 2025-09-24 PROBLEM — Z87.442 HISTORY OF KIDNEY STONES: Status: RESOLVED | Noted: 2021-12-22 | Resolved: 2025-09-24

## 2025-09-24 PROBLEM — Z86.018 HISTORY OF MULTIPLE BENIGN NEVI: Status: RESOLVED | Noted: 2022-03-29 | Resolved: 2025-09-24

## 2025-09-24 PROBLEM — Z87.898 HISTORY OF ELEVATED PROSTATE SPECIFIC ANTIGEN (PSA): Status: RESOLVED | Noted: 2024-08-25 | Resolved: 2025-09-24

## 2025-09-24 PROBLEM — Z88.9 ATOPY: Status: RESOLVED | Noted: 2017-06-13 | Resolved: 2025-09-24

## 2025-09-24 PROBLEM — F41.8 SITUATIONAL ANXIETY: Status: RESOLVED | Noted: 2023-10-13 | Resolved: 2025-09-24

## 2025-09-24 PROBLEM — Z86.03 HISTORY OF NEOPLASM OF UNCERTAIN BEHAVIOR OF SKIN: Status: RESOLVED | Noted: 2022-03-29 | Resolved: 2025-09-24

## 2025-09-24 PROBLEM — R19.5 POSITIVE COLORECTAL CANCER SCREENING USING COLOGUARD TEST: Status: ACTIVE | Noted: 2025-09-24

## 2025-09-24 PROBLEM — Z87.2 HISTORY OF NUMMULAR ECZEMA: Status: RESOLVED | Noted: 2022-03-29 | Resolved: 2025-09-24

## 2025-09-24 PROBLEM — H91.93 HIGH FREQUENCY HEARING LOSS, BILATERAL: Status: RESOLVED | Noted: 2017-06-13 | Resolved: 2025-09-24

## 2025-09-24 PROBLEM — Z86.39 HISTORY OF VITAMIN D DEFICIENCY: Status: RESOLVED | Noted: 2024-01-04 | Resolved: 2025-09-24

## 2025-09-24 PROBLEM — U07.1 COVID-19 VIRUS DETECTED: Status: RESOLVED | Noted: 2022-05-04 | Resolved: 2025-09-24

## 2025-09-24 PROBLEM — Z86.018 HISTORY OF DYSPLASTIC NEVUS: Status: RESOLVED | Noted: 2017-06-13 | Resolved: 2025-09-24

## 2025-09-24 PROBLEM — R79.89 ELEVATED TSH: Status: RESOLVED | Noted: 2025-03-24 | Resolved: 2025-09-24

## 2025-09-24 PROBLEM — Z79.01 WARFARIN ANTICOAGULATION: Status: ACTIVE | Noted: 2025-09-24

## 2025-09-24 PROBLEM — Z86.79 HISTORY OF ATRIAL FIBRILLATION: Status: RESOLVED | Noted: 2017-04-28 | Resolved: 2025-09-24

## 2025-09-24 PROBLEM — D50.9 ANEMIA, IRON DEFICIENCY: Status: ACTIVE | Noted: 2025-09-24
